# Patient Record
Sex: MALE | Race: BLACK OR AFRICAN AMERICAN
[De-identification: names, ages, dates, MRNs, and addresses within clinical notes are randomized per-mention and may not be internally consistent; named-entity substitution may affect disease eponyms.]

---

## 2021-08-31 ENCOUNTER — HOSPITAL ENCOUNTER (EMERGENCY)
Dept: HOSPITAL 53 - M ED | Age: 26
Discharge: HOME | End: 2021-08-31
Payer: COMMERCIAL

## 2021-08-31 VITALS — SYSTOLIC BLOOD PRESSURE: 150 MMHG | DIASTOLIC BLOOD PRESSURE: 90 MMHG

## 2021-08-31 VITALS — BODY MASS INDEX: 26.5 KG/M2 | HEIGHT: 73 IN | WEIGHT: 199.96 LBS
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
## 2021-08-31 DIAGNOSIS — R03.0: ICD-10-CM
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
## 2021-08-31 DIAGNOSIS — S63.635A: Primary | ICD-10-CM

## 2021-08-31 NOTE — REP
INDICATION:

left ring finger injury/decreased  strength



COMPARISON:

None.



TECHNIQUE:

Four views left hand.



FINDINGS:

There is no evidence of acute fracture, dislocation, or intrinsic bone disease.



IMPRESSION:

No fracture or dislocation.





<Electronically signed by Mirza Crowley > 08/31/21 [id number]

## 2021-09-06 ENCOUNTER — HOSPITAL ENCOUNTER (EMERGENCY)
Dept: HOSPITAL 53 - M ED | Age: 26
Discharge: LEFT BEFORE DISCHARGE | End: 2021-09-06
Payer: COMMERCIAL

## 2021-09-06 VITALS — WEIGHT: 194.01 LBS | BODY MASS INDEX: 25.71 KG/M2 | HEIGHT: 73 IN

## 2021-09-06 VITALS — SYSTOLIC BLOOD PRESSURE: 160 MMHG | DIASTOLIC BLOOD PRESSURE: 90 MMHG

## 2021-09-06 DIAGNOSIS — Z53.29: Primary | ICD-10-CM

## 2021-09-07 ENCOUNTER — HOSPITAL ENCOUNTER (INPATIENT)
Dept: HOSPITAL 53 - M ED | Age: 26
LOS: 12 days | Discharge: HOME | DRG: 340 | End: 2021-09-19
Attending: SURGERY | Admitting: SURGERY
Payer: COMMERCIAL

## 2021-09-07 VITALS — BODY MASS INDEX: 25.51 KG/M2 | HEIGHT: 73 IN | WEIGHT: 192.46 LBS

## 2021-09-07 VITALS — SYSTOLIC BLOOD PRESSURE: 127 MMHG | DIASTOLIC BLOOD PRESSURE: 74 MMHG

## 2021-09-07 VITALS — SYSTOLIC BLOOD PRESSURE: 127 MMHG | DIASTOLIC BLOOD PRESSURE: 44 MMHG

## 2021-09-07 VITALS — SYSTOLIC BLOOD PRESSURE: 130 MMHG | DIASTOLIC BLOOD PRESSURE: 76 MMHG

## 2021-09-07 VITALS — SYSTOLIC BLOOD PRESSURE: 131 MMHG | DIASTOLIC BLOOD PRESSURE: 76 MMHG

## 2021-09-07 VITALS — DIASTOLIC BLOOD PRESSURE: 76 MMHG | SYSTOLIC BLOOD PRESSURE: 131 MMHG

## 2021-09-07 DIAGNOSIS — Z20.822: ICD-10-CM

## 2021-09-07 DIAGNOSIS — K35.33: Primary | ICD-10-CM

## 2021-09-07 LAB
ALBUMIN SERPL BCG-MCNC: 3.1 GM/DL (ref 3.2–5.2)
ALT SERPL W P-5'-P-CCNC: 38 U/L (ref 12–78)
BILIRUB CONJ SERPL-MCNC: 0.1 MG/DL (ref 0–0.2)
BILIRUB SERPL-MCNC: 0.5 MG/DL (ref 0.2–1)
BUN SERPL-MCNC: 17 MG/DL (ref 7–18)
CALCIUM SERPL-MCNC: 9.8 MG/DL (ref 8.5–10.1)
CHLORIDE SERPL-SCNC: 95 MEQ/L (ref 98–107)
CO2 SERPL-SCNC: 31 MEQ/L (ref 21–32)
CREAT SERPL-MCNC: 1.3 MG/DL (ref 0.7–1.3)
GFR SERPL CREATININE-BSD FRML MDRD: > 60 ML/MIN/{1.73_M2} (ref 60–?)
GLUCOSE SERPL-MCNC: 100 MG/DL (ref 70–100)
HCT VFR BLD AUTO: 41.1 % (ref 42–52)
HGB BLD-MCNC: 13.3 G/DL (ref 13.5–17.5)
LIPASE SERPL-CCNC: 79 U/L (ref 73–393)
LYMPHOCYTES NFR BLD MANUAL: 11 % (ref 16–44)
MCH RBC QN AUTO: 27.5 PG (ref 27–33)
MCHC RBC AUTO-ENTMCNC: 32.4 G/DL (ref 32–36.5)
MCV RBC AUTO: 85.1 FL (ref 80–96)
MONOCYTES NFR BLD MANUAL: 13 % (ref 0–5)
NEUTROPHILS NFR BLD MANUAL: 65 % (ref 28–66)
PLATELET # BLD AUTO: 337 10^3/UL (ref 150–450)
PLATELET BLD QL SMEAR: NORMAL
POTASSIUM SERPL-SCNC: 3.7 MEQ/L (ref 3.5–5.1)
PROT SERPL-MCNC: 8.6 GM/DL (ref 6.4–8.2)
RBC # BLD AUTO: 4.83 10^6/UL (ref 4.3–6.1)
SODIUM SERPL-SCNC: 132 MEQ/L (ref 136–145)
VARIANT LYMPHS NFR BLD MANUAL: 1 % (ref 0–5)
WBC # BLD AUTO: 15.8 10^3/UL (ref 4–10)

## 2021-09-07 RX ADMIN — ONDANSETRON PRN MG: 2 INJECTION INTRAMUSCULAR; INTRAVENOUS at 19:24

## 2021-09-07 RX ADMIN — DEXTROSE MONOHYDRATE SCH MLS/HR: 5 INJECTION INTRAVENOUS at 22:42

## 2021-09-07 RX ADMIN — SODIUM CHLORIDE SCH MLS/HR: 9 INJECTION, SOLUTION INTRAVENOUS at 22:43

## 2021-09-07 RX ADMIN — KETOROLAC TROMETHAMINE PRN MG: 30 INJECTION, SOLUTION INTRAMUSCULAR at 22:57

## 2021-09-07 RX ADMIN — DIATRIZOATE MEGLUMINE AND DIATRIZOATE SODIUM SCH ML: 600; 100 SOLUTION ORAL; RECTAL at 15:00

## 2021-09-07 RX ADMIN — DIATRIZOATE MEGLUMINE AND DIATRIZOATE SODIUM SCH ML: 600; 100 SOLUTION ORAL; RECTAL at 14:24

## 2021-09-07 RX ADMIN — DOCUSATE SODIUM,SENNOSIDES SCH TAB: 50; 8.6 TABLET, FILM COATED ORAL at 22:42

## 2021-09-07 NOTE — REP
INDICATION:

KUB /possible appendicitis/ileus/stone.



COMPARISON:

Comparison is made with abdominal plain films from earlier on this same date.



TECHNIQUE:

Oral contrast is administered.  Helical scanning is acquired following the intravenous

injection of 100 mL of Isovue 370.



FINDINGS:

Preliminary digital  radiograph again demonstrates a moderate to marked diffuse

abdominal ileus.



On axial images, the lung bases are clear.  There is no evidence of pleural effusion

or upper abdominal ascites in the upper abdomen.



The liver and the spleen are normal in size homogeneous in texture.  No abnormality is

noted in the gallbladder or the pancreas.  Normal adrenal glands are seen.



There is minimal delay in the nephrogram phase of the right kidney and there is mild

right-sided hydronephrosis and hydroureter.



Dilated small and large bowel loops are noted throughout the abdomen.  There is

inflammatory change in the lower small bowel mesenteric fat and there is a appendiceal

abscess in the central lower abdomen.  There is a large calcified appendicular lith

just medial to the cecum and dilated tubular appendiceal lumen is seen coursing

horizontally from right to left into the central abdomen.  Above this is the

appendiceal abscess.  The abscess cavity contains air and fluid and measures 4.8 cm

anteroposterior by 6.4 cm right to left by 3.7 cm cranial to caudal.  No other abscess

is seen.  There is a tiny sliver of right pericolic gutter fluid.  No free air is

seen.  The Pamela appendiceal abscess is not a drainable by image guided catheter

technique.  It is central and surrounded by bowel.



IMPRESSION:

Acute appendicitis with 6.4 cm central abdominal appendiceal abscess.  There is a

large appendicular lith.  No free air.  There is moderate to marked diffuse ileus.

There is mild right-sided hydronephrosis and obstructive nephrogram on the right.  The

right ureter is presumed to be compressed by the central abdominopelvic abscess.





<Electronically signed by Kalen Ibarra > 09/07/21 1013

## 2021-09-07 NOTE — REP
INDICATION:

abdominal pain/possible constipation.



COMPARISON:

None.



TECHNIQUE:



Supine views the abdomen, two views.  KUB.



FINDINGS:

Supine views the abdomen demonstrate a moderate ileus pattern in the bowel gas with

air-filled dilated loops of large and small bowel throughout the upper abdomen.



In addition, there is a 1.5 cm calcification in the right mid abdomen surrounded by

soft tissue density.  This may reflect an enterolith such as appendicular lith with

appendicitis, granulomatous lymph node calcification, or a very large ureteral stone.



Lastly there is the suggestion of minimal ascites.  No organomegaly seen. No other

pathologic calcification noted.



IMPRESSION:

Moderate acute ileus pattern in the bowel gas.  1.5 cm calcification in the right mid

abdomen surrounded by mass effect.  Suggestion of mild ascites.  Acute appendicitis

versus large ureteral stone versus mesenteric esteban calcification.  Recommend CT study

abdomen and pelvis, preferably with IV contrast.





<Electronically signed by Kalen Ibarra > 09/07/21 0916

## 2021-09-07 NOTE — HPE
HISTORY AND PHYSICAL



DATE OF ADMISSION:  09/07/2021



CHIEF COMPLAINT: Abdominal pain.  



HISTORY OF PRESENT ILLNESS:  The patient is a 26-year-old male who presented

with periumbilical and right upper quadrant abdominal pain that started last

Wednesday. He thought he was constipated.  He tried laxatives with minimal

results.  Pain has been persistent since then. He has had slight nausea, no

emesis.  No fevers or chills.  No other recent changes. CT in the emergency

room was positive for a perforated appendix with an abscess; however, the

abscess was unable to be reached with percutaneous drainage; therefore, I was

asked to evaluate him for surgery.  Currently, in the emergency room, he just

spiked a fever of 101, otherwise he has not had one prior. No prior abdominal

surgeries.  No trauma to the area.  No other complaints.  



PAST MEDICAL HISTORY:  Negative. 



PAST SURGICAL HISTORY:  Negative.  



ALLERGIES:  None.



HOME MEDICATIONS:  None.  



SOCIAL HISTORY:  Denies drugs, alcohol or tobacco abuse.  



FAMILY HISTORY:  Noncontributory.



REVIEW OF SYSTEMS:  Pertinent positives and negatives as stated in the history

of present illness. 



PHYSICAL EXAMINATION:  General:  Alert and oriented x3, in no acute distress.

Vitas:  Temperature 101.5, pulse 92, respirations 18, blood pressure 166/89,

pulse oximetry 100% on room air.  HEENT:  Pupils equal, round and reactive to

light and accommodation. Heart:  S1, S2, regular rate and rhythm.  Lungs: Clear

to auscultation bilaterally.  Abdomen: Soft, tender to palpation, mainly

periumbilically.  Diffuse abdominal distension. No rigidity.  Extremities:  No

clubbing, cyanosis or edema.  



LABORATORY: White count 15.8, hemoglobin 13.3, platelets 337, potassium 3.7,

creatinine 1.3, lactic acid 0.9, C-reactive protein 21.3.



IMAGING:  CT abdomen and pelvis showed acute appendicitis with a 6.4 cm central

abdominal abscess. Large appendicolith.  No free air.  Moderate diffuse ileus.  



ASSESSMENT AND PLAN:  The patient is a young 26-year-old male currently with an

acute perforated appendicitis that is not amenable to drainage by IR due to

location behind some bowel loops.  Recommendation is to take him to the

operating room for abdominal washout, drain placement and possible

appendicectomy.  Risks and benefits of procedure were not limited to, but

including bleeding, infection, herniated, damage to surrounding structures,

need for further surgery were discussed in detail with the patient.  Informed

consent was obtained and procedure is planned.  Postoperatively, he will be

kept overnight at minimum with IV fluids and antibiotics.  Once he is afebrile

for 24 hours and pain and labs have improved, he will be able to be discharged

home.

## 2021-09-08 VITALS — SYSTOLIC BLOOD PRESSURE: 132 MMHG | DIASTOLIC BLOOD PRESSURE: 83 MMHG

## 2021-09-08 VITALS — SYSTOLIC BLOOD PRESSURE: 126 MMHG | DIASTOLIC BLOOD PRESSURE: 42 MMHG

## 2021-09-08 VITALS — SYSTOLIC BLOOD PRESSURE: 149 MMHG | DIASTOLIC BLOOD PRESSURE: 90 MMHG

## 2021-09-08 VITALS — DIASTOLIC BLOOD PRESSURE: 83 MMHG | SYSTOLIC BLOOD PRESSURE: 128 MMHG

## 2021-09-08 VITALS — DIASTOLIC BLOOD PRESSURE: 81 MMHG | SYSTOLIC BLOOD PRESSURE: 136 MMHG

## 2021-09-08 VITALS — DIASTOLIC BLOOD PRESSURE: 82 MMHG | SYSTOLIC BLOOD PRESSURE: 135 MMHG

## 2021-09-08 VITALS — SYSTOLIC BLOOD PRESSURE: 129 MMHG | DIASTOLIC BLOOD PRESSURE: 75 MMHG

## 2021-09-08 LAB
BUN SERPL-MCNC: 17 MG/DL (ref 7–18)
CALCIUM SERPL-MCNC: 9.1 MG/DL (ref 8.5–10.1)
CHLORIDE SERPL-SCNC: 99 MEQ/L (ref 98–107)
CO2 SERPL-SCNC: 30 MEQ/L (ref 21–32)
CREAT SERPL-MCNC: 1.29 MG/DL (ref 0.7–1.3)
GFR SERPL CREATININE-BSD FRML MDRD: > 60 ML/MIN/{1.73_M2} (ref 60–?)
GLUCOSE SERPL-MCNC: 139 MG/DL (ref 70–100)
HCT VFR BLD AUTO: 38.1 % (ref 42–52)
HGB BLD-MCNC: 12.3 G/DL (ref 13.5–17.5)
MCH RBC QN AUTO: 27.2 PG (ref 27–33)
MCHC RBC AUTO-ENTMCNC: 32.3 G/DL (ref 32–36.5)
MCV RBC AUTO: 84.1 FL (ref 80–96)
PLATELET # BLD AUTO: 345 10^3/UL (ref 150–450)
POTASSIUM SERPL-SCNC: 4.6 MEQ/L (ref 3.5–5.1)
RBC # BLD AUTO: 4.53 10^6/UL (ref 4.3–6.1)
SODIUM SERPL-SCNC: 134 MEQ/L (ref 136–145)
WBC # BLD AUTO: 13.2 10^3/UL (ref 4–10)

## 2021-09-08 PROCEDURE — 0D9J40Z DRAINAGE OF APPENDIX WITH DRAINAGE DEVICE, PERCUTANEOUS ENDOSCOPIC APPROACH: ICD-10-PCS | Performed by: SURGERY

## 2021-09-08 RX ADMIN — ONDANSETRON PRN MG: 2 INJECTION INTRAMUSCULAR; INTRAVENOUS at 16:25

## 2021-09-08 RX ADMIN — HYDROCODONE BITARTRATE AND ACETAMINOPHEN PRN TAB: 5; 325 TABLET ORAL at 03:25

## 2021-09-08 RX ADMIN — SODIUM CHLORIDE SCH MLS/HR: 9 INJECTION, SOLUTION INTRAVENOUS at 05:28

## 2021-09-08 RX ADMIN — DEXTROSE MONOHYDRATE SCH MLS/HR: 5 INJECTION INTRAVENOUS at 05:28

## 2021-09-08 RX ADMIN — SODIUM CHLORIDE SCH MLS/HR: 9 INJECTION, SOLUTION INTRAVENOUS at 10:50

## 2021-09-08 RX ADMIN — SODIUM CHLORIDE SCH MLS/HR: 9 INJECTION, SOLUTION INTRAVENOUS at 16:26

## 2021-09-08 RX ADMIN — DEXTROSE MONOHYDRATE SCH MLS/HR: 5 INJECTION INTRAVENOUS at 11:13

## 2021-09-08 RX ADMIN — SODIUM CHLORIDE SCH MLS/HR: 9 INJECTION, SOLUTION INTRAVENOUS at 23:37

## 2021-09-08 RX ADMIN — ONDANSETRON PRN MG: 2 INJECTION INTRAMUSCULAR; INTRAVENOUS at 04:19

## 2021-09-08 RX ADMIN — KETOROLAC TROMETHAMINE PRN MG: 30 INJECTION, SOLUTION INTRAMUSCULAR at 23:47

## 2021-09-08 RX ADMIN — DEXTROSE MONOHYDRATE SCH MLS/HR: 5 INJECTION INTRAVENOUS at 22:08

## 2021-09-08 RX ADMIN — DOCUSATE SODIUM,SENNOSIDES SCH TAB: 50; 8.6 TABLET, FILM COATED ORAL at 09:02

## 2021-09-08 RX ADMIN — DOCUSATE SODIUM,SENNOSIDES SCH TAB: 50; 8.6 TABLET, FILM COATED ORAL at 22:08

## 2021-09-08 RX ADMIN — DEXTROSE MONOHYDRATE SCH MLS/HR: 5 INJECTION INTRAVENOUS at 16:25

## 2021-09-08 RX ADMIN — KETOROLAC TROMETHAMINE PRN MG: 30 INJECTION, SOLUTION INTRAMUSCULAR at 16:26

## 2021-09-08 NOTE — REP
INDICATION:

NG placement.



COMPARISON:

None.



TECHNIQUE:

Single portable AP view of the chest was performed.



FINDINGS:

There is no acute infiltrate or pulmonary edema.  Lungs are clear.  The heart is not

significantly enlarged.  The mediastinal silhouette is unremarkable.  The visualized

osseous structures are intact.There is a nasogastric tube, the side port is just above

the gastroesophageal junction.  The tube should be advanced.  The stomach is

moderately dilated with air and fluid and there are adjacent mildly dilated small

bowel loops.



IMPRESSION:

No acute pulmonary disease.There is a nasogastric tube, the side port is just above

the gastroesophageal junction. The tube should be advanced.





<Electronically signed by Mirza Crowley > 09/08/21 [id number]

## 2021-09-08 NOTE — IPNPDOC
Text Note


Date of Service


The patient was seen on 9/8/21.





NOTE


General surgery.  Dr. Horowitz





The patient is a 26-year-old male admitted with acute perforated appendicitis 

status post laparoscopic appendectomy with drain placement 9/7/2021 as per Dr. Horowitz.





This morning, the patient was noted to have some abdominal distention, 

postoperative ileus.  The patient was made n.p.o., NG tube to LIS requested.


Currently, the patient is resting comfortably in bed.





Afebrile.


Heart rate 88, respiratory rate 18, blood pressure 132/83, 95% room air.


Awake and alert sitting up in bed, no acute distress


S1-S2 regular rate rhythm


Lungs clear to auscultation


Abdomen soft, distention noted, drainage tube in place with serosanguineous 

drainage noted.


Extremities with no edema





WBC 13.2, this is compared with 15.8 yesterday.  Hemoglobin 12.3





Assessment/plan


Acute perforated appendicitis status post laparoscopic appendectomy with drain 

placement 9/7/2021 as per Dr. Horowitz.


The patient is reviewed and examined as per Dr. Horowitz this morning.  The 

patient is noted to have postoperative ileus.  The patient is NPO, NG tube to 

LIS requested.


Afebrile


WBC 13.2 which is decreased compared with yesterday


IV Zosyn


IV fluids 125 cc/hr


Drain in place with 180 mL drainage noted yesterday.


Continue to closely monitor.





VS,Fishbone, I+O


VS, Fishbone, I+O


Laboratory Tests


9/7/21 13:29








9/8/21 05:53











Vital Signs








  Date Time  Temp Pulse Resp B/P (MAP) Pulse Ox O2 Delivery O2 Flow Rate FiO2


 


9/8/21 10:00 98.5 88 18 132/83 (99) 95   


 


9/8/21 04:00       1.0 


 


9/8/21 03:55      Nasal Cannula  














I&O- Last 24 Hours up to 6 AM 


 


 9/8/21





 05:59


 


Intake Total 2610 ml


 


Output Total 2112 ml


 


Balance 498 ml

















Mini Reyes               Sep 8, 2021 13:04

## 2021-09-09 VITALS — DIASTOLIC BLOOD PRESSURE: 82 MMHG | SYSTOLIC BLOOD PRESSURE: 136 MMHG

## 2021-09-09 VITALS — DIASTOLIC BLOOD PRESSURE: 79 MMHG | SYSTOLIC BLOOD PRESSURE: 137 MMHG

## 2021-09-09 VITALS — SYSTOLIC BLOOD PRESSURE: 136 MMHG | DIASTOLIC BLOOD PRESSURE: 78 MMHG

## 2021-09-09 LAB
BUN SERPL-MCNC: 21 MG/DL (ref 7–18)
CALCIUM SERPL-MCNC: 8.8 MG/DL (ref 8.5–10.1)
CHLORIDE SERPL-SCNC: 101 MEQ/L (ref 98–107)
CO2 SERPL-SCNC: 31 MEQ/L (ref 21–32)
CREAT SERPL-MCNC: 1.16 MG/DL (ref 0.7–1.3)
GFR SERPL CREATININE-BSD FRML MDRD: > 60 ML/MIN/{1.73_M2} (ref 60–?)
GLUCOSE SERPL-MCNC: 109 MG/DL (ref 70–100)
HCT VFR BLD AUTO: 37.5 % (ref 42–52)
HGB BLD-MCNC: 11.9 G/DL (ref 13.5–17.5)
MCH RBC QN AUTO: 27.4 PG (ref 27–33)
MCHC RBC AUTO-ENTMCNC: 31.7 G/DL (ref 32–36.5)
MCV RBC AUTO: 86.4 FL (ref 80–96)
PLATELET # BLD AUTO: 406 10^3/UL (ref 150–450)
POTASSIUM SERPL-SCNC: 3.6 MEQ/L (ref 3.5–5.1)
RBC # BLD AUTO: 4.34 10^6/UL (ref 4.3–6.1)
SODIUM SERPL-SCNC: 137 MEQ/L (ref 136–145)
WBC # BLD AUTO: 15.2 10^3/UL (ref 4–10)

## 2021-09-09 RX ADMIN — SODIUM CHLORIDE SCH MLS/HR: 9 INJECTION, SOLUTION INTRAVENOUS at 09:09

## 2021-09-09 RX ADMIN — ONDANSETRON PRN MG: 2 INJECTION INTRAMUSCULAR; INTRAVENOUS at 09:24

## 2021-09-09 RX ADMIN — DOCUSATE SODIUM,SENNOSIDES SCH TAB: 50; 8.6 TABLET, FILM COATED ORAL at 21:55

## 2021-09-09 RX ADMIN — HYDROCODONE BITARTRATE AND ACETAMINOPHEN PRN TAB: 5; 325 TABLET ORAL at 09:27

## 2021-09-09 RX ADMIN — KETOROLAC TROMETHAMINE PRN MG: 30 INJECTION, SOLUTION INTRAMUSCULAR at 19:05

## 2021-09-09 RX ADMIN — SODIUM CHLORIDE SCH MLS/HR: 9 INJECTION, SOLUTION INTRAVENOUS at 19:05

## 2021-09-09 RX ADMIN — DEXTROSE MONOHYDRATE SCH MLS/HR: 5 INJECTION INTRAVENOUS at 17:10

## 2021-09-09 RX ADMIN — DEXTROSE MONOHYDRATE SCH MLS/HR: 5 INJECTION INTRAVENOUS at 21:55

## 2021-09-09 RX ADMIN — DEXTROSE MONOHYDRATE SCH MLS/HR: 5 INJECTION INTRAVENOUS at 04:31

## 2021-09-09 RX ADMIN — DOCUSATE SODIUM,SENNOSIDES SCH TAB: 50; 8.6 TABLET, FILM COATED ORAL at 09:09

## 2021-09-09 RX ADMIN — SODIUM CHLORIDE SCH MLS/HR: 9 INJECTION, SOLUTION INTRAVENOUS at 22:05

## 2021-09-09 RX ADMIN — KETOROLAC TROMETHAMINE PRN MG: 30 INJECTION, SOLUTION INTRAMUSCULAR at 09:23

## 2021-09-09 RX ADMIN — DEXTROSE MONOHYDRATE SCH MLS/HR: 5 INJECTION INTRAVENOUS at 09:24

## 2021-09-09 NOTE — IPNPDOC
Text Note


Date of Service


The patient was seen on 9/9/21.





NOTE


General surgery.  Dr. Horowitz





The patient is a 26-year-old male admitted with acute perforated appendicitis 

status post laparoscopic appendectomy with drain placement 9/7/2021 as per Dr. Horowitz.


The patient was noted to have postoperative ileus 9/8, NG tube was placed.  This

morning, the patient reports abdominal bloating and distention is much improved.

 States he has had a bowel movement.  Reports flatus.  Overall reports he is 

feeling much better.





Afebrile


Heart rate 87, respiratory rate 19, blood pressure 136/82, 97% room air


Lungs are clear to auscultation


S1-S2 regular rate rhythm


Abdomen soft, still distended but less pronounced today.  Some mild tenderness 

noted around drain site only.  Drain site intact with cloudy drainage noted.  

220 mL output yesterday.





WBC 15.2, this is increased from 13.2 yesterday.  Hemoglobin 11.9.


BMP this morning pending.


Blood culture x2 negative.





Assessment/plan


Acute perforated appendicitis status post laparoscopic appendectomy with drain 

placement 9/7/2021 as per Dr. Horowitz.


The patient is reviewed and examined as per Dr. Horowitz this morning.  


The patient is noted to have postoperative ileus.  Continue NPO, NG tube.  Will 

check abdominal x-ray to reevaluate.


Afebrile.  WBC is noted to be 15.2, this is slightly increased compared with 

yesterday.


IV Zosyn


IV fluids at 125 cc/h.


Drain in place with 220 mL output yesterday.


Continue to monitor closely, further recommendations pending review of imaging.





VS,Fishbone, I+O


VS, Fishbone, I+O


Laboratory Tests


9/9/21 08:48











Vital Signs








  Date Time  Temp Pulse Resp B/P (MAP) Pulse Ox O2 Delivery O2 Flow Rate FiO2


 


9/9/21 06:00 98.1 87 19 136/82 (100) 97 Room Air  


 


9/8/21 04:00       1.0 














I&O- Last 24 Hours up to 6 AM 


 


 9/9/21





 06:00


 


Intake Total 1740 ml


 


Output Total 2240 ml


 


Balance -500 ml

















Mini Reyes               Sep 9, 2021 09:18

## 2021-09-09 NOTE — REP
INDICATION:

ileus?.



COMPARISON:

Frontal view of the chest 09/08/2021



TECHNIQUE:

Supine and upright views of the abdomen with frontal view of the chest



FINDINGS:

The frontal view the chest is unchanged from the prior exam with the exception of

advancement of the nasogastric tube which is now in the region of the stomach fundus.

The lung fields are clear and the pleural angles are sharp.



Multiple dilated gas and fluid-filled small bowel loops are seen in the abdomen and in

a stepladder fashion on the upright view.  There is no evidence of free air.  A small

amount of radiographic contrast is seen in the descending colon.  A surgical drainage

tube is seen on the right.







IMPRESSION:

SBO versus ileus follow-up is recommended.





<Electronically signed by Monty Blair > 09/09/21 [id number]

## 2021-09-10 VITALS — SYSTOLIC BLOOD PRESSURE: 138 MMHG | DIASTOLIC BLOOD PRESSURE: 83 MMHG

## 2021-09-10 VITALS — SYSTOLIC BLOOD PRESSURE: 137 MMHG | DIASTOLIC BLOOD PRESSURE: 78 MMHG

## 2021-09-10 VITALS — DIASTOLIC BLOOD PRESSURE: 84 MMHG | SYSTOLIC BLOOD PRESSURE: 137 MMHG

## 2021-09-10 LAB
BUN SERPL-MCNC: 20 MG/DL (ref 7–18)
CALCIUM SERPL-MCNC: 9 MG/DL (ref 8.5–10.1)
CHLORIDE SERPL-SCNC: 102 MEQ/L (ref 98–107)
CO2 SERPL-SCNC: 32 MEQ/L (ref 21–32)
CREAT SERPL-MCNC: 1.24 MG/DL (ref 0.7–1.3)
GFR SERPL CREATININE-BSD FRML MDRD: > 60 ML/MIN/{1.73_M2} (ref 60–?)
GLUCOSE SERPL-MCNC: 99 MG/DL (ref 70–100)
HCT VFR BLD AUTO: 36.8 % (ref 42–52)
HGB BLD-MCNC: 11.5 G/DL (ref 13.5–17.5)
MCH RBC QN AUTO: 27.3 PG (ref 27–33)
MCHC RBC AUTO-ENTMCNC: 31.3 G/DL (ref 32–36.5)
MCV RBC AUTO: 87.4 FL (ref 80–96)
PLATELET # BLD AUTO: 453 10^3/UL (ref 150–450)
POTASSIUM SERPL-SCNC: 4 MEQ/L (ref 3.5–5.1)
RBC # BLD AUTO: 4.21 10^6/UL (ref 4.3–6.1)
SODIUM SERPL-SCNC: 139 MEQ/L (ref 136–145)
WBC # BLD AUTO: 14.8 10^3/UL (ref 4–10)

## 2021-09-10 RX ADMIN — KETOROLAC TROMETHAMINE PRN MG: 30 INJECTION, SOLUTION INTRAMUSCULAR at 13:58

## 2021-09-10 RX ADMIN — DEXTROSE MONOHYDRATE SCH MLS/HR: 5 INJECTION INTRAVENOUS at 16:54

## 2021-09-10 RX ADMIN — SODIUM CHLORIDE SCH MLS/HR: 9 INJECTION, SOLUTION INTRAVENOUS at 09:07

## 2021-09-10 RX ADMIN — DEXTROSE MONOHYDRATE SCH MLS/HR: 5 INJECTION INTRAVENOUS at 22:43

## 2021-09-10 RX ADMIN — DOCUSATE SODIUM,SENNOSIDES SCH TAB: 50; 8.6 TABLET, FILM COATED ORAL at 21:00

## 2021-09-10 RX ADMIN — HYDROCODONE BITARTRATE AND ACETAMINOPHEN PRN TAB: 5; 325 TABLET ORAL at 09:06

## 2021-09-10 RX ADMIN — KETOROLAC TROMETHAMINE PRN MG: 30 INJECTION, SOLUTION INTRAMUSCULAR at 21:14

## 2021-09-10 RX ADMIN — DOCUSATE SODIUM,SENNOSIDES SCH TAB: 50; 8.6 TABLET, FILM COATED ORAL at 09:07

## 2021-09-10 RX ADMIN — DOCUSATE SODIUM,SENNOSIDES SCH TAB: 50; 8.6 TABLET, FILM COATED ORAL at 21:01

## 2021-09-10 RX ADMIN — DEXTROSE MONOHYDRATE SCH MLS/HR: 5 INJECTION INTRAVENOUS at 04:23

## 2021-09-10 RX ADMIN — SODIUM CHLORIDE SCH MLS/HR: 9 INJECTION, SOLUTION INTRAVENOUS at 15:50

## 2021-09-10 RX ADMIN — HYDROCODONE BITARTRATE AND ACETAMINOPHEN PRN TAB: 5; 325 TABLET ORAL at 17:00

## 2021-09-10 RX ADMIN — DEXTROSE MONOHYDRATE SCH MLS/HR: 5 INJECTION INTRAVENOUS at 09:06

## 2021-09-10 RX ADMIN — KETOROLAC TROMETHAMINE PRN MG: 30 INJECTION, SOLUTION INTRAMUSCULAR at 04:32

## 2021-09-10 NOTE — IPNPDOC
Text Note


Date of Service


The patient was seen on 9/10/21.





NOTE


General surgery.  Dr. Horowitz





The patient is a 26-year-old male admitted with acute perforated appendicitis 

status post laparoscopic appendectomy with drain placement 9/7/2021 as per Dr. Horowitz.


The patient was noted to have postoperative ileus 9/8, NG tube was placed.  





The dates he is still feeling bloated, no bowel movement reported yesterday.  

Patient states he is not passing flatus.  Has not been out of bed yet today.





Afebrile


Heart rate 71, respiratory rate 20, blood pressure 137/78, 100% room air


Lungs are clear to auscultation


S1-S2 regular rate rhythm


Abdomen soft, still with some distention.  No tenderness noted.  Drain site 

intact drainage is now clear, serous.  Output not recorded yesterday.





WBC 14.8, 15.2 yesterday.  Hemoglobin 11.5.


Blood culture x2 negative.





Assessment/plan


Acute perforated appendicitis status post laparoscopic appendectomy with drain 

placement 9/7/2021 as per Dr. Horowitz.


The patient is noted to have postoperative ileus.  Continue NPO, NG tube.  


Afebrile.  WBC is noted to be 14.8, this is slightly increased compared with yes

terday.


IV Zosyn


IV fluids at 125 cc/h.


Drain in place with clear drainage noted now, output is not recorded yesterday


Continue to monitor closely. 





Addendum.  The patient is reviewed with Dr. Horowitz, the patient subsequently had

large bowel movement this morning.  Plan to clamp NG tube, trial of clear 

liquids.





VS,Fishbone, I+O


VS, Fishbone, I+O


Laboratory Tests


9/10/21 07:04











Vital Signs








  Date Time  Temp Pulse Resp B/P (MAP) Pulse Ox O2 Delivery O2 Flow Rate FiO2


 


9/10/21 05:52 97.0 71 20 137/78 (97) 100 Room Air  


 


9/8/21 04:00       1.0 














I&O- Last 24 Hours up to 6 AM 


 


 9/10/21





 05:59


 


Intake Total 1565 ml


 


Output Total 2900 ml


 


Balance -1335 ml

















Mini Reyes              Sep 10, 2021 08:53

## 2021-09-10 NOTE — IPNPDOC
Text Note


Date of Service


The patient was seen on 9/10/21.





NOTE


No acute events overnight. He is tolerating the NG, and is still having BMS. His

abd distention and pain are much improved. 





VSSAF





NAD





abd - soft, nd, NT, incisions c/d/i, drain with slightly cloudy fluid much less 

purulent than yesterday. 





labs - below





A) 27y/o male s/p lap washout of perforated appendix with abscess and drain 

placement


P) clamp NG


clq diet


amb in you


plan on advancing diet and DC ng if he can tolerate diet until 5pm





Lucien Horowitz DO





VS,Fishbone, I+O


VS, Fishbone, I+O


Laboratory Tests


9/10/21 07:04











Vital Signs








  Date Time  Temp Pulse Resp B/P (MAP) Pulse Ox O2 Delivery O2 Flow Rate FiO2


 


9/10/21 05:52 97.0 71 20 137/78 (97) 100 Room Air  


 


9/8/21 04:00       1.0 














I&O- Last 24 Hours up to 6 AM 


 


 9/10/21





 05:59


 


Intake Total 1565 ml


 


Output Total 2900 ml


 


Balance -1335 ml

















CAROLA HOROWITZ DO            Sep 10, 2021 08:55

## 2021-09-11 VITALS — DIASTOLIC BLOOD PRESSURE: 82 MMHG | SYSTOLIC BLOOD PRESSURE: 139 MMHG

## 2021-09-11 VITALS — DIASTOLIC BLOOD PRESSURE: 82 MMHG | SYSTOLIC BLOOD PRESSURE: 135 MMHG

## 2021-09-11 VITALS — DIASTOLIC BLOOD PRESSURE: 83 MMHG | SYSTOLIC BLOOD PRESSURE: 135 MMHG

## 2021-09-11 LAB
BUN SERPL-MCNC: 13 MG/DL (ref 7–18)
CALCIUM SERPL-MCNC: 9.1 MG/DL (ref 8.5–10.1)
CHLORIDE SERPL-SCNC: 103 MEQ/L (ref 98–107)
CO2 SERPL-SCNC: 30 MEQ/L (ref 21–32)
CREAT SERPL-MCNC: 1.14 MG/DL (ref 0.7–1.3)
GFR SERPL CREATININE-BSD FRML MDRD: > 60 ML/MIN/{1.73_M2} (ref 60–?)
GLUCOSE SERPL-MCNC: 103 MG/DL (ref 70–100)
HCT VFR BLD AUTO: 38.1 % (ref 42–52)
HGB BLD-MCNC: 11.9 G/DL (ref 13.5–17.5)
MCH RBC QN AUTO: 27.2 PG (ref 27–33)
MCHC RBC AUTO-ENTMCNC: 31.2 G/DL (ref 32–36.5)
MCV RBC AUTO: 87 FL (ref 80–96)
PLATELET # BLD AUTO: 485 10^3/UL (ref 150–450)
POTASSIUM SERPL-SCNC: 3.5 MEQ/L (ref 3.5–5.1)
RBC # BLD AUTO: 4.38 10^6/UL (ref 4.3–6.1)
SODIUM SERPL-SCNC: 137 MEQ/L (ref 136–145)
WBC # BLD AUTO: 18 10^3/UL (ref 4–10)

## 2021-09-11 RX ADMIN — KETOROLAC TROMETHAMINE PRN MG: 30 INJECTION, SOLUTION INTRAMUSCULAR at 21:10

## 2021-09-11 RX ADMIN — KETOROLAC TROMETHAMINE PRN MG: 30 INJECTION, SOLUTION INTRAMUSCULAR at 07:34

## 2021-09-11 RX ADMIN — DOCUSATE SODIUM,SENNOSIDES SCH TAB: 50; 8.6 TABLET, FILM COATED ORAL at 21:08

## 2021-09-11 RX ADMIN — SODIUM CHLORIDE SCH MLS/HR: 9 INJECTION, SOLUTION INTRAVENOUS at 05:29

## 2021-09-11 RX ADMIN — ACETAMINOPHEN PRN MG: 325 TABLET ORAL at 23:14

## 2021-09-11 RX ADMIN — DEXTROSE MONOHYDRATE SCH MLS/HR: 5 INJECTION INTRAVENOUS at 23:06

## 2021-09-11 RX ADMIN — SODIUM CHLORIDE SCH MLS/HR: 9 INJECTION, SOLUTION INTRAVENOUS at 15:05

## 2021-09-11 RX ADMIN — DEXTROSE MONOHYDRATE SCH MLS/HR: 5 INJECTION INTRAVENOUS at 17:04

## 2021-09-11 RX ADMIN — DOCUSATE SODIUM,SENNOSIDES SCH TAB: 50; 8.6 TABLET, FILM COATED ORAL at 07:34

## 2021-09-11 RX ADMIN — DEXTROSE MONOHYDRATE SCH MLS/HR: 5 INJECTION INTRAVENOUS at 04:32

## 2021-09-11 RX ADMIN — DEXTROSE MONOHYDRATE SCH MLS/HR: 5 INJECTION INTRAVENOUS at 11:14

## 2021-09-11 RX ADMIN — SODIUM CHLORIDE SCH MLS/HR: 9 INJECTION, SOLUTION INTRAVENOUS at 11:14

## 2021-09-11 NOTE — IPN
PROGRESS NOTE



DATE:  09/11/2021



HISTORY: The patient is a 26-year-old man who was admitted on the evening of

the 7th of September with abdominal pain. CT scan revealed evidence for

appendicitis with abscess. This was not felt to be amenable to percutaneous

drainage, Dr. Horowitz took him to the [facility] where he underwent laparoscopy with

placement of a drain. He has been continued on Zosyn for antibiotic coverage.

He had some difficulty with an ileus but has not been advanced back to a

regular diet as of last evening. He reports some discomfort in his right mid

and lower abdomen in particular. He denies any nausea or vomiting currently. He

has had some flatus. His abdomen remains somewhat distended. 



Vital signs show that he has been afebrile over the past 24 hours. His pulse is

in the upper 70's to 80 and his blood pressure is good. His room air oxygen

saturations are normal. 



Intake and output show that yesterday he had probably not the 13 liters that

are recorded. I suspect this represents a typo. He had gastric drainage of 875

with 40 mL of drainage from his abdominal drain. 



PHYSICAL EXAMINATION: 

GENERAL APPEARANCE: A thin young man propped up in the bed. He is alert and

oriented. 

HEART: Regular rate and rhythm and he is not tachycardic.

LUNGS: Clear. 

ABDOMEN: Somewhat protuberant. He has some bowel sounds but these seem

diminished. He has a drain exiting the lower mid abdomen. This has a small

amount of serous fluid in the bottle with a small amount of turbid fluid in the

tubing. He has another trocar site in the left upper abdomen. 



LABORATORY STUDIES: Today white count of 18,000 which is elevated from 15,000

yesterday. Hemoglobin is 12 with a hematocrit of 38 and the platelet count is

485,000. 



Chemistry profile shows a sodium of 137, potassium 3.5, chloride 103, CO2 of

30, BUN of 13, creatinine 1.1 and a glucose of 103. 



IMPRESSION: The patient overall appears to be doing somewhat better. His NG

tube was removed last evening and he has tolerated a diet. He remains somewhat

distended. His white count has gone up slightly today to 18,000 though he

remains afebrile. 



PLAN: At this point I have decided to keep him in the hospital at least another

day. His Zosyn will be continued. His labs will be repeated in the morning. If

his white count continues to rise, then a repeat CT scan may be required to

look for evidence of an undrained collection. 

DENA

## 2021-09-12 VITALS — SYSTOLIC BLOOD PRESSURE: 141 MMHG | DIASTOLIC BLOOD PRESSURE: 81 MMHG

## 2021-09-12 VITALS — DIASTOLIC BLOOD PRESSURE: 83 MMHG | SYSTOLIC BLOOD PRESSURE: 142 MMHG

## 2021-09-12 VITALS — SYSTOLIC BLOOD PRESSURE: 141 MMHG | DIASTOLIC BLOOD PRESSURE: 83 MMHG

## 2021-09-12 LAB
BASOPHILS # BLD AUTO: 0.1 10^3/UL (ref 0–0.2)
BASOPHILS NFR BLD AUTO: 0.3 % (ref 0–1)
BUN SERPL-MCNC: 11 MG/DL (ref 7–18)
CALCIUM SERPL-MCNC: 9.3 MG/DL (ref 8.5–10.1)
CHLORIDE SERPL-SCNC: 105 MEQ/L (ref 98–107)
CO2 SERPL-SCNC: 29 MEQ/L (ref 21–32)
CREAT SERPL-MCNC: 1.01 MG/DL (ref 0.7–1.3)
EOSINOPHIL # BLD AUTO: 0.1 10^3/UL (ref 0–0.5)
EOSINOPHIL NFR BLD AUTO: 0.8 % (ref 0–3)
GFR SERPL CREATININE-BSD FRML MDRD: > 60 ML/MIN/{1.73_M2} (ref 60–?)
GLUCOSE SERPL-MCNC: 90 MG/DL (ref 70–100)
HCT VFR BLD AUTO: 36.8 % (ref 42–52)
HGB BLD-MCNC: 11.5 G/DL (ref 13.5–17.5)
LYMPHOCYTES # BLD AUTO: 1.5 10^3/UL (ref 1.5–5)
LYMPHOCYTES NFR BLD AUTO: 8.8 % (ref 24–44)
MCH RBC QN AUTO: 27.2 PG (ref 27–33)
MCHC RBC AUTO-ENTMCNC: 31.3 G/DL (ref 32–36.5)
MCV RBC AUTO: 87 FL (ref 80–96)
MONOCYTES # BLD AUTO: 0.8 10^3/UL (ref 0–0.8)
MONOCYTES NFR BLD AUTO: 4.5 % (ref 2–8)
NEUTROPHILS # BLD AUTO: 14.3 10^3/UL (ref 1.5–8.5)
NEUTROPHILS NFR BLD AUTO: 83.1 % (ref 36–66)
PLATELET # BLD AUTO: 476 10^3/UL (ref 150–450)
POTASSIUM SERPL-SCNC: 3.9 MEQ/L (ref 3.5–5.1)
RBC # BLD AUTO: 4.23 10^6/UL (ref 4.3–6.1)
SODIUM SERPL-SCNC: 139 MEQ/L (ref 136–145)
WBC # BLD AUTO: 17.2 10^3/UL (ref 4–10)

## 2021-09-12 RX ADMIN — DEXTROSE MONOHYDRATE SCH MLS/HR: 5 INJECTION INTRAVENOUS at 15:56

## 2021-09-12 RX ADMIN — KETOROLAC TROMETHAMINE PRN MG: 30 INJECTION, SOLUTION INTRAMUSCULAR at 06:34

## 2021-09-12 RX ADMIN — DOCUSATE SODIUM,SENNOSIDES SCH TAB: 50; 8.6 TABLET, FILM COATED ORAL at 10:17

## 2021-09-12 RX ADMIN — DEXTROSE MONOHYDRATE SCH MG: 50 INJECTION, SOLUTION INTRAVENOUS at 15:56

## 2021-09-12 RX ADMIN — METOCLOPRAMIDE SCH MG: 5 INJECTION, SOLUTION INTRAMUSCULAR; INTRAVENOUS at 14:23

## 2021-09-12 RX ADMIN — DEXTROSE MONOHYDRATE SCH MLS/HR: 5 INJECTION INTRAVENOUS at 05:05

## 2021-09-12 RX ADMIN — POTASSIUM CHLORIDE, DEXTROSE MONOHYDRATE AND SODIUM CHLORIDE SCH MLS/HR: 150; 5; 450 INJECTION, SOLUTION INTRAVENOUS at 14:22

## 2021-09-12 RX ADMIN — DIATRIZOATE MEGLUMINE AND DIATRIZOATE SODIUM SCH ML: 600; 100 SOLUTION ORAL; RECTAL at 11:05

## 2021-09-12 RX ADMIN — DEXTROSE MONOHYDRATE SCH MLS/HR: 5 INJECTION INTRAVENOUS at 10:18

## 2021-09-12 RX ADMIN — METOCLOPRAMIDE SCH MG: 5 INJECTION, SOLUTION INTRAMUSCULAR; INTRAVENOUS at 20:11

## 2021-09-12 RX ADMIN — DIATRIZOATE MEGLUMINE AND DIATRIZOATE SODIUM SCH ML: 600; 100 SOLUTION ORAL; RECTAL at 11:48

## 2021-09-12 RX ADMIN — DEXTROSE MONOHYDRATE SCH MLS/HR: 5 INJECTION INTRAVENOUS at 23:19

## 2021-09-12 NOTE — IPN
PROGRESS NOTE



DATE:  09/12/2021



HISTORY:  Patient was admitted on September 7, 2021 with abdominal pain and

distention, and CT scan showed a dilated appendix with periappendiceal abscess

in the low mid-abdomen.  His bowel was quite distended.  He underwent

laparoscopy with placement of a drain.  He has remained on Zosyn.  He has been

attempting a diet, but has been taking fairly small amounts.  He is tolerating

some liquids, but reports he is belching a lot and remains quite distended.  



VITAL SIGNS:  Show that he has been afebrile.  His pulse is in the upper 60s to

low 80s.  Blood pressure is good and his room air saturations are normal.  



INTAKE AND OUTPUT:  Show that yesterday he had 3090 in with no volume

measurements of his output.  His drain has had apparently minimal out over the

last two days.  He has voided at least four times, but again, the amount has

not been measured.



PHYSICAL EXAMINATION:  

GENERAL:  Patient is a pleasant young man, sitting up in the hospital bed.  He

appears fairly comfortable.  He is alert and oriented.  

HEART EXAM:  Shows a regular rhythm.

LUNGS:  Clear.

ABDOMEN:  Remains distended.  He has a small dressing in his left upper

quadrant and a drain exiting his mid-abdomen that has a minimal amount of

serous fluid in the tubing.  The abdomen remains distended.  He is tympanitic

across the upper abdomen and fairly firm.  

EXTREMITIES:  Lower extremities are without any significant edema.



LABORATORY STUDIES:

Include a CBC that shows that his white count remains at 17,000 with a

hemoglobin of 12, hematocrit 37 and a platelet count of 476,000.  His

differential count shows 83% neutrophils, 9% lymphocytes and 4% monocytes.



Chemistry profile shows normal electrolytes with a BUN of 11, creatinine 1 and

a glucose of 90. 



Based on his persistent elevated white count and abdominal distention, a CT

scan of the abdomen and pelvis was obtained.  This shows a persistent lower

mid-abdominal abscess.  This, according to the radiologist, is actually

increased in size from the prior study of September 7, 2021.  There is a drain

in the right side of the abdomen, but this is not located within the abscess. 

His small bowel and stomach remain quite distended with fluid and air.  The

radiologist suggested the possibility of an actual bowel obstruction in the

area of the abscess.



IMPRESSION:  Patient has a persistent un-drained abscess of the lower

mid-abdomen from a perforated appendicitis.  Unfortunately, his drain is not in

the abscess cavity.  He remains quite distended.  



PLAN:  Patient was counseled that he will likely require further surgery to

drain his abscess.  I will make him nothing by mouth (NPO).  If he decompresses

adequately, we may be able to avoid replacing his nasogastric (NG) tube.  I

will not reinsert this at this time.  He will remain on the Zosyn.  I will

start him on some Protonix to decrease his gastric secretions and try a small

dose of Reglan to see if this will help decompress his gastrointestinal (GI)

tract somewhat.  I will stop his oral pain medications and put him on morphine

as needed.  I will leave it to Dr. Horowitz in the morning to determine the next

steps surgically.

DENA

## 2021-09-12 NOTE — REP
INDICATION:

evaluate for persistent abscess.



COMPARISON:



09/07/2021.



TECHNIQUE:

Oral contrast was administered.  CT abdomen performed without IV contrast.  CT abdomen

and pelvis performed with the intravenous administration of 100 cc of Isovue 370.

Sagittal and coronal reconstruction images are performed.



FINDINGS:



Lung bases: There is a small right pleural effusion with mild adjacent dependent

atelectatic change.

Liver:  Normal

Gallbladder:  Unremarkable.

Spleen:  Normal.

Adrenals:  Normal.

Pancreas:  Normal.

Kidneys:  Normal.

Small and large bowel: There is significant dilatation of the stomach and jejunum with

air and fluid.  There appears to be a zone of transition from dilated to nondilated

small bowel at the superior margin of the known abscess in the upper pelvis, along the

adjacent surgical drain.

Free fluid:   The lobulated abscess collection is again seen at that location and has

increased in size since the prior study, measuring approximately 5.8 x 5.9 x 8.6 cm.

An appendicolith is again seen at the right superolateral margin of the abscess. There

are surrounding streaky inflammatory changes in the mesenteric fat.

Abdominal aorta: No aneurysm or dissection.

Adenopathy:  Multiple subcentimeter mesenteric lymph nodes are seen..

Appendix: There are findings compatible with ruptured appendicitis as discussed above.

Osseous structures:  Unremarkable.

Pelvis:  No mass.

A surgical drain enters the mid pelvis and the distal tip is located in the anterior

aspect of the right upper quadrant.



IMPRESSION:

Small right pleural effusion with mild adjacent dependent atelectatic change.



There are findings suggesting small bowel obstruction in the region of the superior

aspect of the abscess in the upper pelvis.  The lobulated abscess collection at that

location has increased in size since the prior study measuring approximately 5.8 x 5.9

x 8.6 cm. A surgical drain enters the mid pelvis, the distal tip is in the anterior

aspect of the right upper quadrant.





<Electronically signed by Mirza Crowley > 09/12/21 [id number]

## 2021-09-13 VITALS — DIASTOLIC BLOOD PRESSURE: 81 MMHG | SYSTOLIC BLOOD PRESSURE: 138 MMHG

## 2021-09-13 VITALS — DIASTOLIC BLOOD PRESSURE: 77 MMHG | SYSTOLIC BLOOD PRESSURE: 136 MMHG

## 2021-09-13 VITALS — SYSTOLIC BLOOD PRESSURE: 132 MMHG | DIASTOLIC BLOOD PRESSURE: 75 MMHG

## 2021-09-13 VITALS — DIASTOLIC BLOOD PRESSURE: 82 MMHG | SYSTOLIC BLOOD PRESSURE: 140 MMHG

## 2021-09-13 VITALS — DIASTOLIC BLOOD PRESSURE: 84 MMHG | SYSTOLIC BLOOD PRESSURE: 141 MMHG

## 2021-09-13 VITALS — SYSTOLIC BLOOD PRESSURE: 138 MMHG | DIASTOLIC BLOOD PRESSURE: 82 MMHG

## 2021-09-13 VITALS — DIASTOLIC BLOOD PRESSURE: 77 MMHG | SYSTOLIC BLOOD PRESSURE: 138 MMHG

## 2021-09-13 VITALS — SYSTOLIC BLOOD PRESSURE: 142 MMHG | DIASTOLIC BLOOD PRESSURE: 80 MMHG

## 2021-09-13 LAB
BASOPHILS # BLD AUTO: 0 10^3/UL (ref 0–0.2)
BASOPHILS NFR BLD AUTO: 0.2 % (ref 0–1)
BUN SERPL-MCNC: 8 MG/DL (ref 7–18)
CALCIUM SERPL-MCNC: 8.9 MG/DL (ref 8.5–10.1)
CHLORIDE SERPL-SCNC: 105 MEQ/L (ref 98–107)
CO2 SERPL-SCNC: 29 MEQ/L (ref 21–32)
CREAT SERPL-MCNC: 1.17 MG/DL (ref 0.7–1.3)
EOSINOPHIL # BLD AUTO: 0.1 10^3/UL (ref 0–0.5)
EOSINOPHIL NFR BLD AUTO: 0.6 % (ref 0–3)
GFR SERPL CREATININE-BSD FRML MDRD: > 60 ML/MIN/{1.73_M2} (ref 60–?)
GLUCOSE SERPL-MCNC: 103 MG/DL (ref 70–100)
HCT VFR BLD AUTO: 37.5 % (ref 42–52)
HGB BLD-MCNC: 11.7 G/DL (ref 13.5–17.5)
LYMPHOCYTES # BLD AUTO: 1.2 10^3/UL (ref 1.5–5)
LYMPHOCYTES NFR BLD AUTO: 6.8 % (ref 24–44)
MCH RBC QN AUTO: 27.1 PG (ref 27–33)
MCHC RBC AUTO-ENTMCNC: 31.2 G/DL (ref 32–36.5)
MCV RBC AUTO: 87 FL (ref 80–96)
MONOCYTES # BLD AUTO: 0.8 10^3/UL (ref 0–0.8)
MONOCYTES NFR BLD AUTO: 4.4 % (ref 2–8)
NEUTROPHILS # BLD AUTO: 15.1 10^3/UL (ref 1.5–8.5)
NEUTROPHILS NFR BLD AUTO: 85.9 % (ref 36–66)
PLATELET # BLD AUTO: 466 10^3/UL (ref 150–450)
POTASSIUM SERPL-SCNC: 4 MEQ/L (ref 3.5–5.1)
RBC # BLD AUTO: 4.31 10^6/UL (ref 4.3–6.1)
SODIUM SERPL-SCNC: 139 MEQ/L (ref 136–145)
WBC # BLD AUTO: 17.6 10^3/UL (ref 4–10)

## 2021-09-13 PROCEDURE — 0DTJ4ZZ RESECTION OF APPENDIX, PERCUTANEOUS ENDOSCOPIC APPROACH: ICD-10-PCS | Performed by: SURGERY

## 2021-09-13 PROCEDURE — 8E0W4CZ ROBOTIC ASSISTED PROCEDURE OF TRUNK REGION, PERCUTANEOUS ENDOSCOPIC APPROACH: ICD-10-PCS | Performed by: SURGERY

## 2021-09-13 RX ADMIN — DEXTROSE MONOHYDRATE SCH MLS/HR: 5 INJECTION INTRAVENOUS at 16:43

## 2021-09-13 RX ADMIN — POTASSIUM CHLORIDE, DEXTROSE MONOHYDRATE AND SODIUM CHLORIDE SCH MLS/HR: 150; 5; 450 INJECTION, SOLUTION INTRAVENOUS at 02:42

## 2021-09-13 RX ADMIN — DEXTROSE MONOHYDRATE SCH MLS/HR: 5 INJECTION INTRAVENOUS at 09:06

## 2021-09-13 RX ADMIN — METOCLOPRAMIDE SCH MG: 5 INJECTION, SOLUTION INTRAMUSCULAR; INTRAVENOUS at 20:38

## 2021-09-13 RX ADMIN — DEXTROSE MONOHYDRATE SCH MLS/HR: 5 INJECTION INTRAVENOUS at 23:02

## 2021-09-13 RX ADMIN — DEXTROSE MONOHYDRATE SCH MLS/HR: 5 INJECTION INTRAVENOUS at 02:42

## 2021-09-13 RX ADMIN — METOCLOPRAMIDE SCH MG: 5 INJECTION, SOLUTION INTRAMUSCULAR; INTRAVENOUS at 02:42

## 2021-09-13 RX ADMIN — METOCLOPRAMIDE SCH MG: 5 INJECTION, SOLUTION INTRAMUSCULAR; INTRAVENOUS at 07:59

## 2021-09-13 RX ADMIN — DEXTROSE MONOHYDRATE SCH MG: 50 INJECTION, SOLUTION INTRAVENOUS at 09:00

## 2021-09-13 RX ADMIN — METOCLOPRAMIDE SCH MG: 5 INJECTION, SOLUTION INTRAMUSCULAR; INTRAVENOUS at 15:15

## 2021-09-13 RX ADMIN — POTASSIUM CHLORIDE, DEXTROSE MONOHYDRATE AND SODIUM CHLORIDE SCH MLS/HR: 150; 5; 450 INJECTION, SOLUTION INTRAVENOUS at 15:16

## 2021-09-13 NOTE — RO
OPERATIVE NOTE



DATE OF OPERATION: 09/13/2021



PREOPERATIVE DIAGNOSIS:  Perforated appendicitis with peritoneal abscess.



POSTOPERATIVE DIAGNOSIS: Perforated appendicitis with peritoneal abscess.



PROCEDURE: Robotic-assisted diagnostic laparoscopy with release of abdominal

adhesions, abdominal washout and drain placement.



SURGEON: Mirza Horowitz DO 



ASSISTANT: LANE Summers



ANESTHESIA: General.



ESTIMATED BLOOD LOSS: 10 ml



COMPLICATIONS: None.



INDICATIONS FOR PROCEDURE: The patient is a 26-year-old male who presented with

a complicated perforated appendix with a walled off abscess a week ago. He

underwent a diagnostic laparoscopy with a washout and drain placement then. He

started to do well over a few days. However, he started to get worse again with

increasing pain, ileus and decreased output. Repeat imaging this weekend

revealed that his abscess had recurred. Plan was to take him back today for

washout again and placement of another drain. Risks and benefits of the

procedure not limited to but including bleeding, infection, hernias, damage to

surrounding structures, need for further surgery were discussed in detail with

the patient. Informed consent was obtained and procedure is planned.



DESCRIPTION OF PROCEDURE: The patient was brought back to operating room 7.

After sufficient sedation, the abdomen was sterilely prepped and draped. Next,

a timeout was done to confirm proper patient and proper procedure. Following

that, the original left upper quadrant incision from previous surgery was

reopened and an 8 mm OptiView port was used to gain access to the abdomen. Once

the abdomen was entered, two more ports were placed, one in the left

mid-abdomen, one in the right upper quadrant. Next, the robot was docked to the

ports. Once inside, the small bowel adhesions to the abdominal wall and the

pelvis were gently released using blunt dissection. Once the abscess cavity was

reached just medial to the cecum, a large amount of purulent fluid was

encountered. This was all aspirated out using a suction . Once that

was completed, I continued to mobilize around the cecum, however, could not

definitively identify the appendix due to being extensively inflamed. Because

of that, a 19 [place] drain was then taken. It was placed just medial to the

cecum in this abscess cavity, brought out through the previous incision in the

suprapubic area. The abdomen was then desufflated slowly, making sure that the

drain did not move while the loops of small bowel came over top of it. Once

that was completed, the drain was sutured in place with a 2-0 silk. The skin

incision was closed with 4-0 Vicryl subcuticular sutures. The abdomen was

cleaned and dried. Steri-Strips, 4x4 and tape were applied.

## 2021-09-13 NOTE — IPNPDOC
Text Note


Date of Service


The patient was seen on 9/13/21.





NOTE


Over the weekend his wbc count elevated and repeat CT shows persistent abscess 

in the RLQ resulting in partial SBO vs. ileus. NG was replaced last evening, and

plan is for OR today for washout of the abscess and new drain placement. 





VSSAF





NAD





abd - soft, slight distention, drain in place





labs - below





A) 25y/o male s/p lap washout of perforated appendix with abscess and drain 

placement


P) NG


IVF


abx


plan for repeat drain placement again today with abd washout.


consent is signed


no changes to H+P. 





Lucien Horowitz DO





VS,Cira, I+O


VS, Shellye, I+O


Laboratory Tests


9/13/21 06:45











Vital Signs








  Date Time  Temp Pulse Resp B/P (MAP) Pulse Ox O2 Delivery O2 Flow Rate FiO2


 


9/13/21 06:00 98.7 74 18 142/80 (100) 96 Room Air  


 


9/8/21 04:00       1.0 














I&O- Last 24 Hours up to 6 AM 


 


 9/13/21





 05:59


 


Intake Total 2240 ml


 


Output Total 700 ml


 


Balance 1540 ml

















CAROLA HOROWITZ DO            Sep 13, 2021 07:24

## 2021-09-13 NOTE — REPVR
PROCEDURE INFORMATION: 

Exam: XR Chest 

Exam date and time: 9/12/2021 11:08 PM 

Age: 26 years old 

Clinical indication: Check nasogastric tube placement 



TECHNIQUE: 

Imaging protocol: XR of the chest. 

Views: 1 view. 



COMPARISON: 

CR Abdomen,Flat Upright,PA CHEST 9/9/2021 9:50 AM 



FINDINGS: 

Tubes, catheters and devices: There is a nasogastric tube terminating in the 

proximal body of the stomach. 



Lungs: Unremarkable. No consolidation. No pulmonary edema. 

Pleural spaces: The trace right pleural effusion that can be seen in the CT 

abdomen and pelvis on 9/12/2021 is radiographically occult. No pneumothorax is 

noted. 

Heart/Mediastinum: Unremarkable. No cardiomegaly. 

Bones/joints: Unremarkable. 



Limited gastrointestinal tract: There is gaseous distention of the stomach. 

Dilated loops of small bowel are noted in the upper abdomen, which are better 

visualized in the CT abdomen and pelvis on 9/12/2021. 



IMPRESSION: 

1. Nasogastric tube terminating in the proximal body of the stomach. 

2. Dilated loops of small bowel in the upper abdomen, which can be seen with a 

small bowel obstruction or ileus. 



Electronically signed by: Darien Birmingham On 09/13/2021  00:43:36 AM

## 2021-09-14 VITALS — DIASTOLIC BLOOD PRESSURE: 77 MMHG | SYSTOLIC BLOOD PRESSURE: 131 MMHG

## 2021-09-14 VITALS — SYSTOLIC BLOOD PRESSURE: 132 MMHG | DIASTOLIC BLOOD PRESSURE: 72 MMHG

## 2021-09-14 VITALS — SYSTOLIC BLOOD PRESSURE: 131 MMHG | DIASTOLIC BLOOD PRESSURE: 77 MMHG

## 2021-09-14 VITALS — DIASTOLIC BLOOD PRESSURE: 77 MMHG | SYSTOLIC BLOOD PRESSURE: 132 MMHG

## 2021-09-14 VITALS — DIASTOLIC BLOOD PRESSURE: 78 MMHG | SYSTOLIC BLOOD PRESSURE: 131 MMHG

## 2021-09-14 LAB
BUN SERPL-MCNC: 12 MG/DL (ref 7–18)
CALCIUM SERPL-MCNC: 8.9 MG/DL (ref 8.5–10.1)
CHLORIDE SERPL-SCNC: 104 MEQ/L (ref 98–107)
CO2 SERPL-SCNC: 31 MEQ/L (ref 21–32)
CREAT SERPL-MCNC: 1.22 MG/DL (ref 0.7–1.3)
GFR SERPL CREATININE-BSD FRML MDRD: > 60 ML/MIN/{1.73_M2} (ref 60–?)
GLUCOSE SERPL-MCNC: 101 MG/DL (ref 70–100)
HCT VFR BLD AUTO: 37.2 % (ref 42–52)
HGB BLD-MCNC: 11.8 G/DL (ref 13.5–17.5)
MCH RBC QN AUTO: 27.3 PG (ref 27–33)
MCHC RBC AUTO-ENTMCNC: 31.7 G/DL (ref 32–36.5)
MCV RBC AUTO: 86.1 FL (ref 80–96)
PLATELET # BLD AUTO: 518 10^3/UL (ref 150–450)
POTASSIUM SERPL-SCNC: 4.5 MEQ/L (ref 3.5–5.1)
RBC # BLD AUTO: 4.32 10^6/UL (ref 4.3–6.1)
SODIUM SERPL-SCNC: 140 MEQ/L (ref 136–145)
WBC # BLD AUTO: 13.1 10^3/UL (ref 4–10)

## 2021-09-14 RX ADMIN — POTASSIUM CHLORIDE, DEXTROSE MONOHYDRATE AND SODIUM CHLORIDE SCH MLS/HR: 150; 5; 450 INJECTION, SOLUTION INTRAVENOUS at 04:19

## 2021-09-14 RX ADMIN — POTASSIUM CHLORIDE, DEXTROSE MONOHYDRATE AND SODIUM CHLORIDE SCH MLS/HR: 150; 5; 450 INJECTION, SOLUTION INTRAVENOUS at 17:14

## 2021-09-14 RX ADMIN — METOCLOPRAMIDE SCH MG: 5 INJECTION, SOLUTION INTRAMUSCULAR; INTRAVENOUS at 19:43

## 2021-09-14 RX ADMIN — DEXTROSE MONOHYDRATE SCH MLS/HR: 5 INJECTION INTRAVENOUS at 10:59

## 2021-09-14 RX ADMIN — DEXTROSE MONOHYDRATE SCH MLS/HR: 5 INJECTION INTRAVENOUS at 04:19

## 2021-09-14 RX ADMIN — DEXTROSE MONOHYDRATE SCH MG: 50 INJECTION, SOLUTION INTRAVENOUS at 08:29

## 2021-09-14 RX ADMIN — METOCLOPRAMIDE SCH MG: 5 INJECTION, SOLUTION INTRAMUSCULAR; INTRAVENOUS at 14:00

## 2021-09-14 RX ADMIN — DEXTROSE MONOHYDRATE SCH MLS/HR: 5 INJECTION INTRAVENOUS at 23:23

## 2021-09-14 RX ADMIN — MORPHINE SULFATE PRN MG: 2 INJECTION, SOLUTION INTRAMUSCULAR; INTRAVENOUS at 20:00

## 2021-09-14 RX ADMIN — METOCLOPRAMIDE SCH MG: 5 INJECTION, SOLUTION INTRAMUSCULAR; INTRAVENOUS at 02:09

## 2021-09-14 RX ADMIN — ACETAMINOPHEN PRN MG: 325 TABLET ORAL at 02:23

## 2021-09-14 RX ADMIN — METOCLOPRAMIDE SCH MG: 5 INJECTION, SOLUTION INTRAMUSCULAR; INTRAVENOUS at 08:29

## 2021-09-14 RX ADMIN — DEXTROSE MONOHYDRATE SCH MLS/HR: 5 INJECTION INTRAVENOUS at 17:15

## 2021-09-14 NOTE — IPNPDOC
Text Note


Date of Service


The patient was seen on 9/14/21.





NOTE


General surgery.  Dr. Horowitz





The patient is a 26-year-old male admitted with acute perforated appendicitis 

status post laparoscopic appendectomy with drain placement 9/7/2021 as per Dr. Horowitz.  The patient was noted to have recurrence of abscess, status post 

robotic assisted diagnostic laparoscopy with release of adhesions, abdominal 

washout and drain placement as per Dr. Horowitz 9/13/2021.





The patient is resting in bed.  States pain is controlled.  NG tube in place.  

Denies flatus or BM.  Has not been out of bed yet.





Afebrile.  VSS.


NG tube in place


Lungs clear to auscultation


S1-S2 regular rate rhythm


Abdomen with surgical incisions clean/dry/intact.  DENITA drain in place with small 

amount of serosanguineous drainage noted.  Soft, distended, mild tenderness 

around incision sites.


No edema





WBC 13.1, this is decreased from 17.6.  Hemoglobin 11.8.


NG tube output 525 mL yesterday, 650 mL so far today.  DENITA drain 220 mL 

yesterday, 30 mL today.





Assessment/plan


Acute perforated appendicitis status post laparoscopic appendectomy with drain 

placement 9/7/2021 as per Dr. Horowitz.


The patient was noted to have recurrence of abscess, status post robotic 

assisted diagnostic laparoscopy with release of adhesions, abdominal washout and

drain placement as per Dr. Horowitz 9/13/2021.


The patient reports pain is controlled.


WBC trending downward.


NG tube in place


IV Zosyn


IV fluids 80 cc/h.


Continue to monitor





VS,Fishbone, I+O


VS, Fishbone, I+O


Laboratory Tests


9/14/21 05:49











Vital Signs








  Date Time  Temp Pulse Resp B/P (MAP) Pulse Ox O2 Delivery O2 Flow Rate FiO2


 


9/14/21 06:09 97.6 72 20 131/78 (95) 96 Room Air  


 


9/13/21 20:00       2.0 














I&O- Last 24 Hours up to 6 AM 


 


 9/14/21





 06:00


 


Intake Total 2550 ml


 


Output Total 3640 ml


 


Balance -1090 ml

















Mini Reyes              Sep 14, 2021 09:09

## 2021-09-15 VITALS — DIASTOLIC BLOOD PRESSURE: 79 MMHG | SYSTOLIC BLOOD PRESSURE: 130 MMHG

## 2021-09-15 VITALS — DIASTOLIC BLOOD PRESSURE: 77 MMHG | SYSTOLIC BLOOD PRESSURE: 134 MMHG

## 2021-09-15 LAB
HCT VFR BLD AUTO: 38.7 % (ref 42–52)
HGB BLD-MCNC: 12 G/DL (ref 13.5–17.5)
MCH RBC QN AUTO: 27 PG (ref 27–33)
MCHC RBC AUTO-ENTMCNC: 31 G/DL (ref 32–36.5)
MCV RBC AUTO: 87 FL (ref 80–96)
PLATELET # BLD AUTO: 559 10^3/UL (ref 150–450)
RBC # BLD AUTO: 4.45 10^6/UL (ref 4.3–6.1)
WBC # BLD AUTO: 12.6 10^3/UL (ref 4–10)

## 2021-09-15 RX ADMIN — DEXTROSE MONOHYDRATE SCH MLS/HR: 5 INJECTION INTRAVENOUS at 21:36

## 2021-09-15 RX ADMIN — METOCLOPRAMIDE SCH MG: 5 INJECTION, SOLUTION INTRAMUSCULAR; INTRAVENOUS at 08:00

## 2021-09-15 RX ADMIN — DEXTROSE MONOHYDRATE SCH MG: 50 INJECTION, SOLUTION INTRAVENOUS at 09:38

## 2021-09-15 RX ADMIN — POTASSIUM CHLORIDE, DEXTROSE MONOHYDRATE AND SODIUM CHLORIDE SCH MLS/HR: 150; 5; 450 INJECTION, SOLUTION INTRAVENOUS at 09:38

## 2021-09-15 RX ADMIN — METOCLOPRAMIDE SCH MG: 5 INJECTION, SOLUTION INTRAMUSCULAR; INTRAVENOUS at 21:35

## 2021-09-15 RX ADMIN — DEXTROSE MONOHYDRATE SCH MLS/HR: 5 INJECTION INTRAVENOUS at 04:17

## 2021-09-15 RX ADMIN — MORPHINE SULFATE PRN MG: 2 INJECTION, SOLUTION INTRAMUSCULAR; INTRAVENOUS at 22:17

## 2021-09-15 RX ADMIN — DEXTROSE MONOHYDRATE SCH MLS/HR: 5 INJECTION INTRAVENOUS at 16:33

## 2021-09-15 RX ADMIN — DEXTROSE MONOHYDRATE SCH MLS/HR: 5 INJECTION INTRAVENOUS at 09:52

## 2021-09-15 RX ADMIN — METOCLOPRAMIDE SCH MG: 5 INJECTION, SOLUTION INTRAMUSCULAR; INTRAVENOUS at 02:03

## 2021-09-15 RX ADMIN — POTASSIUM CHLORIDE, DEXTROSE MONOHYDRATE AND SODIUM CHLORIDE SCH MLS/HR: 150; 5; 450 INJECTION, SOLUTION INTRAVENOUS at 16:33

## 2021-09-15 RX ADMIN — METOCLOPRAMIDE SCH MG: 5 INJECTION, SOLUTION INTRAMUSCULAR; INTRAVENOUS at 13:52

## 2021-09-15 NOTE — IPNPDOC
Text Note


Date of Service


The patient was seen on 9/15/21.





NOTE


No acute events overnight. He has been ambulating in the halls, but no flatus or

BM yet. No problems with nausea or emesis with the NG in. NG output is still 

bilious. 





VSSAF





NAD





abd - soft, distention improved, drain in place serosanguinous





labs - below





A) 27y/o male s/p lap washout of perforated appendix with abscess and drain 

placement


P) NG


IVF


abx


ambulate in you, we will advance diet and clamp NG once he is passing flatus. 





Lucien Horowitz DO





VS,Fishbone, I+O


VS, Fishbone, I+O


Laboratory Tests


9/15/21 06:24











Vital Signs








  Date Time  Temp Pulse Resp B/P (MAP) Pulse Ox O2 Delivery O2 Flow Rate FiO2


 


9/15/21 06:46 97.4 80 19 130/79 (96) 94 Room Air  


 


9/13/21 20:00       2.0 














I&O- Last 24 Hours up to 6 AM 


 


 9/15/21





 06:00


 


Intake Total 1220 ml


 


Output Total 2060 ml


 


Balance -840 ml

















CAROLA HOROWITZ DO            Sep 15, 2021 10:14

## 2021-09-16 VITALS — SYSTOLIC BLOOD PRESSURE: 130 MMHG | DIASTOLIC BLOOD PRESSURE: 81 MMHG

## 2021-09-16 VITALS — DIASTOLIC BLOOD PRESSURE: 95 MMHG | SYSTOLIC BLOOD PRESSURE: 144 MMHG

## 2021-09-16 VITALS — DIASTOLIC BLOOD PRESSURE: 94 MMHG | SYSTOLIC BLOOD PRESSURE: 145 MMHG

## 2021-09-16 VITALS — DIASTOLIC BLOOD PRESSURE: 80 MMHG | SYSTOLIC BLOOD PRESSURE: 127 MMHG

## 2021-09-16 VITALS — SYSTOLIC BLOOD PRESSURE: 144 MMHG | DIASTOLIC BLOOD PRESSURE: 95 MMHG

## 2021-09-16 LAB
HCT VFR BLD AUTO: 39.8 % (ref 42–52)
HGB BLD-MCNC: 12.3 G/DL (ref 13.5–17.5)
MCH RBC QN AUTO: 26.7 PG (ref 27–33)
MCHC RBC AUTO-ENTMCNC: 30.9 G/DL (ref 32–36.5)
MCV RBC AUTO: 86.3 FL (ref 80–96)
PLATELET # BLD AUTO: 579 10^3/UL (ref 150–450)
RBC # BLD AUTO: 4.61 10^6/UL (ref 4.3–6.1)
WBC # BLD AUTO: 9.9 10^3/UL (ref 4–10)

## 2021-09-16 RX ADMIN — DEXTROSE MONOHYDRATE SCH MLS/HR: 5 INJECTION INTRAVENOUS at 04:26

## 2021-09-16 RX ADMIN — METOCLOPRAMIDE SCH MG: 5 INJECTION, SOLUTION INTRAMUSCULAR; INTRAVENOUS at 21:35

## 2021-09-16 RX ADMIN — DEXTROSE MONOHYDRATE SCH MLS/HR: 5 INJECTION INTRAVENOUS at 17:47

## 2021-09-16 RX ADMIN — METOCLOPRAMIDE SCH MG: 5 INJECTION, SOLUTION INTRAMUSCULAR; INTRAVENOUS at 09:23

## 2021-09-16 RX ADMIN — METOCLOPRAMIDE SCH MG: 5 INJECTION, SOLUTION INTRAMUSCULAR; INTRAVENOUS at 02:29

## 2021-09-16 RX ADMIN — DEXTROSE MONOHYDRATE SCH MLS/HR: 5 INJECTION INTRAVENOUS at 21:43

## 2021-09-16 RX ADMIN — MORPHINE SULFATE PRN MG: 2 INJECTION, SOLUTION INTRAMUSCULAR; INTRAVENOUS at 21:36

## 2021-09-16 RX ADMIN — POTASSIUM CHLORIDE, DEXTROSE MONOHYDRATE AND SODIUM CHLORIDE SCH MLS/HR: 150; 5; 450 INJECTION, SOLUTION INTRAVENOUS at 00:24

## 2021-09-16 RX ADMIN — DEXTROSE MONOHYDRATE SCH MLS/HR: 5 INJECTION INTRAVENOUS at 09:23

## 2021-09-16 RX ADMIN — METOCLOPRAMIDE SCH MG: 5 INJECTION, SOLUTION INTRAMUSCULAR; INTRAVENOUS at 13:57

## 2021-09-16 RX ADMIN — POTASSIUM CHLORIDE, DEXTROSE MONOHYDRATE AND SODIUM CHLORIDE SCH MLS/HR: 150; 5; 450 INJECTION, SOLUTION INTRAVENOUS at 17:47

## 2021-09-16 RX ADMIN — DEXTROSE MONOHYDRATE SCH MG: 50 INJECTION, SOLUTION INTRAVENOUS at 09:23

## 2021-09-16 NOTE — IPNPDOC
Text Note


Date of Service


The patient was seen on 9/16/21.





NOTE


General surgery.  Dr. Horowitz





The patient is a 26-year-old male admitted with acute perforated appendicitis 

status post laparoscopic appendectomy with drain placement 9/7/2021 as per Dr. Horowitz.  The patient was noted to have recurrence of abscess, status post 

robotic assisted diagnostic laparoscopy with release of adhesions, abdominal 

washout and drain placement as per Dr. Horowitz 9/13/2021.





The patient is resting in bed.  States pain is controlled.  NG tube in place.  

Denies flatus.  No BM documented.





Afebrile.  VSS.


NG tube in place


Lungs clear to auscultation


S1-S2 regular rate rhythm


Abdomen with surgical incisions clean/dry/intact.  DENITA drain in place with small 

amount of serosanguineous drainage noted.  Soft, distended, mild tenderness 

around incision sites.


No edema





WBC 9.9, continued downward trend.


NG tube output 1000 mL yesterday, 350 mL so far today.  


DENITA drain output not documented yesterday.  20 mL documented today.





Assessment/plan


Acute perforated appendicitis status post laparoscopic appendectomy with drain 

placement 9/7/2021 as per Dr. Horowitz.


The patient was noted to have recurrence of abscess, status post robotic 

assisted diagnostic laparoscopy with release of adhesions, abdominal washout and

drain placement as per Dr. Horowitz 9/13/2021.


The pt is reviewed and examined as per Dr Diaz today. 


The patient reports pain is controlled.


WBC continue to trend downward.


The patient denies flatus, has not had BM.


NG tube in place


IV Zosyn


IV fluids 80 cc/h.


AXR pending. 


Encourage out of bed and ambulation


Continue to monitor





VS,Fishbone, I+O


VS, Fishbone, I+O


Laboratory Tests


9/16/21 06:05











Vital Signs








  Date Time  Temp Pulse Resp B/P (MAP) Pulse Ox O2 Delivery O2 Flow Rate FiO2


 


9/16/21 06:00 97.8 69 18 127/80 (96) 97 Room Air  


 


9/13/21 20:00       2.0 














I&O- Last 24 Hours up to 6 AM 


 


 9/16/21





 06:00


 


Intake Total 1930 ml


 


Output Total 2270 ml


 


Balance -340 ml











Attending Note


Attending Note


Patient reports feeling mildly better, the abdomen feels flatter.  No nausea or 

vomiting though no flatus yet.  He drinks nearly a liter for the past 24 hours 

from the nasogastric tube.  He reports he had 1 small bowel movement yesterday.





Still moderately distended, tympanic, relatively quiet.  Nontender on palpation.

 Drainage tube has serosanguineous drainage.





Plan:


I will get a abdominal x-ray to gauge degree of distention of the small bowel 

relative to the colon.  We will try to clamp the tube and see how he does.


Encouraged to ambulate.











Mini Reyes              Sep 16, 2021 08:27


NAOMIE DIAZ MD         Sep 16, 2021 12:46

## 2021-09-16 NOTE — REP
INDICATION:

ffup sbo.



COMPARISON:

Radiograph 09/09/2021. CT 09/12/2021.



TECHNIQUE:

Two AP views abdomen and pelvis.



FINDINGS:

Surgical drain is seen with the tip is now near the midline of the lower abdomen.

Multiple moderately dilated small bowel loops are seen throughout the abdomen, as seen

on the recent CT scan.  There is a small amount of contrast in nondistended right

colon.  A nasogastric tube is seen with side port in the region of the stomach.  There

is also small amount of contrast in the collapsed rectum.



IMPRESSION:

Nasogastric tube with side port in the region of the stomach, surgical drain with tip

in the lower abdomen near the midline.  There are multiple persistent moderately

dilated small bowel loops.





<Electronically signed by Mirza Crowley > 09/16/21 [id number]

## 2021-09-17 VITALS — DIASTOLIC BLOOD PRESSURE: 85 MMHG | SYSTOLIC BLOOD PRESSURE: 133 MMHG

## 2021-09-17 VITALS — SYSTOLIC BLOOD PRESSURE: 143 MMHG | DIASTOLIC BLOOD PRESSURE: 89 MMHG

## 2021-09-17 VITALS — DIASTOLIC BLOOD PRESSURE: 84 MMHG | SYSTOLIC BLOOD PRESSURE: 136 MMHG

## 2021-09-17 LAB
HCT VFR BLD AUTO: 41.2 % (ref 42–52)
HGB BLD-MCNC: 13.1 G/DL (ref 13.5–17.5)
MCH RBC QN AUTO: 27.4 PG (ref 27–33)
MCHC RBC AUTO-ENTMCNC: 31.8 G/DL (ref 32–36.5)
MCV RBC AUTO: 86.2 FL (ref 80–96)
PLATELET # BLD AUTO: 649 10^3/UL (ref 150–450)
RBC # BLD AUTO: 4.78 10^6/UL (ref 4.3–6.1)
WBC # BLD AUTO: 7.4 10^3/UL (ref 4–10)

## 2021-09-17 RX ADMIN — METOCLOPRAMIDE SCH MG: 5 INJECTION, SOLUTION INTRAMUSCULAR; INTRAVENOUS at 20:06

## 2021-09-17 RX ADMIN — DEXTROSE MONOHYDRATE SCH MLS/HR: 5 INJECTION INTRAVENOUS at 15:54

## 2021-09-17 RX ADMIN — DEXTROSE MONOHYDRATE SCH MLS/HR: 5 INJECTION INTRAVENOUS at 11:32

## 2021-09-17 RX ADMIN — MORPHINE SULFATE PRN MG: 2 INJECTION, SOLUTION INTRAMUSCULAR; INTRAVENOUS at 04:23

## 2021-09-17 RX ADMIN — DEXTROSE MONOHYDRATE SCH MLS/HR: 5 INJECTION INTRAVENOUS at 04:18

## 2021-09-17 RX ADMIN — POTASSIUM CHLORIDE, DEXTROSE MONOHYDRATE AND SODIUM CHLORIDE SCH MLS/HR: 150; 5; 450 INJECTION, SOLUTION INTRAVENOUS at 15:55

## 2021-09-17 RX ADMIN — METOCLOPRAMIDE SCH MG: 5 INJECTION, SOLUTION INTRAMUSCULAR; INTRAVENOUS at 08:22

## 2021-09-17 RX ADMIN — DEXTROSE MONOHYDRATE SCH MLS/HR: 5 INJECTION INTRAVENOUS at 22:16

## 2021-09-17 RX ADMIN — POTASSIUM CHLORIDE, DEXTROSE MONOHYDRATE AND SODIUM CHLORIDE SCH MLS/HR: 150; 5; 450 INJECTION, SOLUTION INTRAVENOUS at 06:09

## 2021-09-17 RX ADMIN — METOCLOPRAMIDE SCH MG: 5 INJECTION, SOLUTION INTRAMUSCULAR; INTRAVENOUS at 02:16

## 2021-09-17 RX ADMIN — DEXTROSE MONOHYDRATE SCH MG: 50 INJECTION, SOLUTION INTRAVENOUS at 08:22

## 2021-09-17 RX ADMIN — METOCLOPRAMIDE SCH MG: 5 INJECTION, SOLUTION INTRAMUSCULAR; INTRAVENOUS at 14:00

## 2021-09-17 NOTE — IPNPDOC
Text Note


Date of Service


The patient was seen on 9/17/21.





NOTE


General surgery.  Dr. Horowitz





The patient is a 26-year-old male admitted with acute perforated appendicitis 

status post laparoscopic appendectomy with drain placement 9/7/2021 as per Dr. Horowitz.  The patient was noted to have recurrence of abscess, status post 

robotic assisted diagnostic laparoscopy with release of adhesions, abdominal 

washout and drain placement as per Dr. Horowitz 9/13/2021.





The patient is resting in bed.  States pain is controlled.  NG tube clamped 

since yesterday AM.   Denies flatus or BM. Denies Nausea/vomiting. 





Afebrile.  VSS.


NG tube in place


Lungs clear to auscultation


S1-S2 regular rate rhythm


Abdomen with surgical incisions clean/dry/intact.  DENITA drain in place with small 

amount of serosanguineous drainage noted.  Soft, distended, mild tenderness 

around incision sites.


No edema





WBC 7.4, continued downward trend.


NG tube clamped 


DENITA drain output 20ml yesterday.  7 mL today.





Assessment/plan


Acute perforated appendicitis status post laparoscopic appendectomy with drain 

placement 9/7/2021 as per Dr. Horowitz.


The patient was noted to have recurrence of abscess, status post robotic 

assisted diagnostic laparoscopy with release of adhesions, abdominal washout and

drain placement as per Dr. Horowitz 9/13/2021.


The pt is reviewed and examined as per Dr Horowitz. 


The patient reports pain is controlled.


WBC continue to trend downward.


The patient denies flatus, has not had BM.


tolerated NGT clamping since yesterday AM. 


IV Zosyn


IV fluids 80 cc/h.


AXR yesterday still with dilated loops.


Encourage out of bed and ambulation


trial of clear liquids today, if tolerating clears DC NGT later this afternoon. 


Continue to monitor





VS,Fishbone, I+O


VS, Fishbone, I+O


Laboratory Tests


9/17/21 06:18











Vital Signs








  Date Time  Temp Pulse Resp B/P (MAP) Pulse Ox O2 Delivery O2 Flow Rate FiO2


 


9/17/21 06:00 97.5 61 16 136/84 (101) 98 Room Air  


 


9/13/21 20:00       2.0 














I&O- Last 24 Hours up to 6 AM 


 


 9/17/21





 05:59


 


Intake Total 880 ml


 


Output Total 1350 ml


 


Balance -470 ml

















Mini Reyes              Sep 17, 2021 09:43

## 2021-09-18 VITALS — DIASTOLIC BLOOD PRESSURE: 86 MMHG | SYSTOLIC BLOOD PRESSURE: 137 MMHG

## 2021-09-18 VITALS — SYSTOLIC BLOOD PRESSURE: 137 MMHG | DIASTOLIC BLOOD PRESSURE: 86 MMHG

## 2021-09-18 VITALS — DIASTOLIC BLOOD PRESSURE: 85 MMHG | SYSTOLIC BLOOD PRESSURE: 136 MMHG

## 2021-09-18 LAB
HCT VFR BLD AUTO: 40.9 % (ref 42–52)
HGB BLD-MCNC: 12.8 G/DL (ref 13.5–17.5)
MCH RBC QN AUTO: 26.7 PG (ref 27–33)
MCHC RBC AUTO-ENTMCNC: 31.3 G/DL (ref 32–36.5)
MCV RBC AUTO: 85.2 FL (ref 80–96)
PLATELET # BLD AUTO: 696 10^3/UL (ref 150–450)
RBC # BLD AUTO: 4.8 10^6/UL (ref 4.3–6.1)
WBC # BLD AUTO: 7.2 10^3/UL (ref 4–10)

## 2021-09-18 RX ADMIN — DEXTROSE MONOHYDRATE SCH MLS/HR: 5 INJECTION INTRAVENOUS at 04:18

## 2021-09-18 RX ADMIN — DEXTROSE MONOHYDRATE SCH MG: 50 INJECTION, SOLUTION INTRAVENOUS at 09:07

## 2021-09-18 RX ADMIN — METOCLOPRAMIDE SCH MG: 5 INJECTION, SOLUTION INTRAMUSCULAR; INTRAVENOUS at 09:00

## 2021-09-18 RX ADMIN — SIMETHICONE SCH MG: 80 TABLET, CHEWABLE ORAL at 16:48

## 2021-09-18 RX ADMIN — SIMETHICONE SCH MG: 80 TABLET, CHEWABLE ORAL at 20:38

## 2021-09-18 RX ADMIN — METOCLOPRAMIDE SCH MG: 5 INJECTION, SOLUTION INTRAMUSCULAR; INTRAVENOUS at 20:38

## 2021-09-18 RX ADMIN — DEXTROSE MONOHYDRATE SCH MLS/HR: 5 INJECTION INTRAVENOUS at 22:59

## 2021-09-18 RX ADMIN — POTASSIUM CHLORIDE, DEXTROSE MONOHYDRATE AND SODIUM CHLORIDE SCH MLS/HR: 150; 5; 450 INJECTION, SOLUTION INTRAVENOUS at 09:00

## 2021-09-18 RX ADMIN — METOCLOPRAMIDE SCH MG: 5 INJECTION, SOLUTION INTRAMUSCULAR; INTRAVENOUS at 14:06

## 2021-09-18 RX ADMIN — DEXTROSE MONOHYDRATE SCH MLS/HR: 5 INJECTION INTRAVENOUS at 16:48

## 2021-09-18 RX ADMIN — Medication SCH EA: at 09:00

## 2021-09-18 RX ADMIN — METOCLOPRAMIDE SCH MG: 5 INJECTION, SOLUTION INTRAMUSCULAR; INTRAVENOUS at 01:49

## 2021-09-18 RX ADMIN — SIMETHICONE SCH MG: 80 TABLET, CHEWABLE ORAL at 09:07

## 2021-09-18 RX ADMIN — DEXTROSE MONOHYDRATE SCH MLS/HR: 5 INJECTION INTRAVENOUS at 10:40

## 2021-09-18 RX ADMIN — POTASSIUM CHLORIDE, DEXTROSE MONOHYDRATE AND SODIUM CHLORIDE SCH MLS/HR: 150; 5; 450 INJECTION, SOLUTION INTRAVENOUS at 20:39

## 2021-09-18 RX ADMIN — SIMETHICONE SCH MG: 80 TABLET, CHEWABLE ORAL at 14:00

## 2021-09-18 RX ADMIN — ACETAMINOPHEN PRN MG: 325 TABLET ORAL at 01:09

## 2021-09-18 NOTE — IPN
PROGRESS NOTE



DATE:  09/18/2021



SUBJECTIVE:  The patient is status post draining of a periappendiceal

abscess/pelvic abscess and has been afebrile over the last 24 hours.  His drain

has been putting out minimal drainage.  It is serosanguineous but a little bit

on the dark side, probably old blood and from a laboratory standpoint he

continues to be within normal white count range.  Overall he states that he has

been having bowel movements and some flatus this morning but still feels

distended.  



OBJECTIVE:  

He is tensely distended, tympanitic without guarding, without rebound.  He has

been tolerating some clear liquids this morning.  



ASSESSMENT AND PLAN: The patient still has somewhat of an ileus associated with

his infectious process and inflammation appreciated associated with this.  

Thus at this point I would recommend that we keep him on clear liquids.  We

will increase his activity and since he is not really taking a lot of liquids

in, we will keep his IV fluids going.  We will see how things go over the next

24 hours but otherwise continue with the current treatment as planned.

## 2021-09-19 VITALS — SYSTOLIC BLOOD PRESSURE: 134 MMHG | DIASTOLIC BLOOD PRESSURE: 86 MMHG

## 2021-09-19 LAB
HCT VFR BLD AUTO: 39.5 % (ref 42–52)
HGB BLD-MCNC: 12.6 G/DL (ref 13.5–17.5)
MCH RBC QN AUTO: 27 PG (ref 27–33)
MCHC RBC AUTO-ENTMCNC: 31.9 G/DL (ref 32–36.5)
MCV RBC AUTO: 84.8 FL (ref 80–96)
PLATELET # BLD AUTO: 681 10^3/UL (ref 150–450)
RBC # BLD AUTO: 4.66 10^6/UL (ref 4.3–6.1)
WBC # BLD AUTO: 7.6 10^3/UL (ref 4–10)

## 2021-09-19 RX ADMIN — DEXTROSE MONOHYDRATE SCH MLS/HR: 5 INJECTION INTRAVENOUS at 10:37

## 2021-09-19 RX ADMIN — METOCLOPRAMIDE SCH MG: 5 INJECTION, SOLUTION INTRAMUSCULAR; INTRAVENOUS at 02:14

## 2021-09-19 RX ADMIN — Medication SCH EA: at 09:44

## 2021-09-19 RX ADMIN — DEXTROSE MONOHYDRATE SCH MG: 50 INJECTION, SOLUTION INTRAVENOUS at 08:35

## 2021-09-19 RX ADMIN — POTASSIUM CHLORIDE, DEXTROSE MONOHYDRATE AND SODIUM CHLORIDE SCH MLS/HR: 150; 5; 450 INJECTION, SOLUTION INTRAVENOUS at 04:07

## 2021-09-19 RX ADMIN — DEXTROSE MONOHYDRATE SCH MLS/HR: 5 INJECTION INTRAVENOUS at 04:07

## 2021-09-19 RX ADMIN — SIMETHICONE SCH MG: 80 TABLET, CHEWABLE ORAL at 08:35

## 2021-09-19 RX ADMIN — METOCLOPRAMIDE SCH MG: 5 INJECTION, SOLUTION INTRAMUSCULAR; INTRAVENOUS at 08:35

## 2021-09-20 NOTE — DSES
DISCHARGE SUMMARY



DATE OF ADMISSION:  09/07/2021

DATE OF DISCHARGE:  09/19/2021



ADMISSION DIAGNOSIS: Ruptured appendicitis.



DISCHARGE DIAGNOSIS: Ruptured appendicitis.



HOSPITAL COURSE:  The patient is a 26-year-old male who presented to the

hospital with abdominal pain for a few days, found to have perforated appendix

with walled off abscess on CT. Due to pain and elevated white count plan was to

take him to the operating room for washout and drain placement. He was not a

candidate for IR drainage due to the location of the abscess being posterior to

some small bowel.



He was brought to the operating room on 7th. He had abdominal washout and drain

placed. Postoperatively he did well. His white count did improve for about

three days but then it started to go back up again. He was tolerating diet

during that time, having some slight flatus. However, then he started having

increased abdominal distention again. He then had repeat CT scan done on the

12th showing that he had recurrence of this abscess. On the morning of the 13th

he was brought back to the operating room for repeat abdominal washout and

manipulation of the drain back into the abscess cavity that it had fallen out

of. There was again a large fluid collection of purulent fluid there that was

washed out during the procedure and the drain was repositioned. Postop from

that procedure he continued to have ileus for a few days. After postop day

three the NG tube was clamped and he was started on clear liquids. On the 4th

postop day he tolerated that and the NG tube was removed. By postop day five

from his second procedure his diet was slowly advanced and then on the 19th he

was discharged home.



DISCHARGE MEDICATIONS:  He was sent home with PO antibiotics. He did not

require any pain control upon discharge.



His drain was also removed prior to discharge. He will follow up with us in the

office in a couple of weeks with further recommendations to follow. Likely plan

on elective robotic appendectomy at least 6-8 weeks from now.

DENA

## 2021-09-23 NOTE — RO
OPERATIVE NOTE







DATE OF OPERATION: 09/07/2021



PREOPERATIVE DIAGNOSIS: Ruptured appendicitis. 



POSTOPERATIVE DIAGNOSIS: Ruptured appendicitis. 



PROCEDURE: Diagnostic laparoscopy with abdominal washout.  Abscess drainage and

drain placement.  



SURGEON: Mirza Horowitz DO



ASSISTANT: None. 



ANESTHESIA: General.



ESTIMATED BLOOD LOSS: 5.



COMPLICATIONS:  None.  



INDICATION FOR PROCEDURE:  The patient is a 26-year-old male who presented to

the hospital on the 7th with abdominal pains for over a week, found to have

ruptured appendix with a walled off abscess on CAT scan.  This abscess was

unable to be drained by percutaneous drainage; therefore recommendation was to

proceed with operative intervention.  Risks and benefits of the procedure were

not limited to, but included bleeding, infection, hernias, damage to

surrounding structures and need for further surgery is discussed in details

with the patient and informed consent was obtained and procedure was planned.  



DESCRIPTION OF PROCEDURE: The patient was brought back to operating room. After

sufficient sedation, the abdomen was sterilely prepped and draped.  Next,

time-out was done to confirm proper patient and proper procedure.  Following

that, an 8 mm incision was made in the left upper quadrant.  Veress needle

inserted and the abdomen was insufflated to 50 mmHg.  The Veress needle was

then removed and a 5 mm Optiview port was used to gain access to the abdomen.

Once the abdomen was entered, there were multiple dilated loops of small bowel.

I was able to get over top of these, look down into the pelvis and place

another suprapubic midline 5 mm port. From there, I was able to gently mobilize

the small bowel out of the pelvis, away from the abscess and enter the abscess

cavity.  The abscess cavity was aspirated out.  I was able to fully irrigate

and wash out that area. The medial cecum and terminal ileum were all acutely

and severely inflamed. There was significant inflammation in that area.

Therefore removal of the appendix itself was unable to be obtained at this

time.  The area was fully washed out and a 19 Sami Melecio drain was placed

inside the abscess cavity and brought out through the suprapubic port site and

sutured to the skin with a 2-0 silk suture. The abdomen was then desulflated.

The other port was removed.  Skin incisions were closed with 4-0 Vicryl

subcuticular sutures.  Incisions were covered with tape and gauze. The patient

was awakened from anesthesia and sent to the PACU in stable condition.

## 2021-09-23 NOTE — RO
OPERATIVE NOTE



DATE OF OPERATION: 09/07/2021



PREOPERATIVE DIAGNOSIS: Acute ruptured appendicitis.  



POSTOPERATIVE DIAGNOSIS: Acute ruptured appendicitis.  



PROCEDURES: Exploratory laparoscopy with abdominal washout and drain placement. 



SURGEON: Mirza Horowitz DO 



ASSISTANT: None. 



ANESTHESIA: General.



ESTIMATED BLOOD LOSS: 2 mL. 



COMPLICATIONS: None.



INDICATIONS FOR PROCEDURE: The patient is a 26-year-old male who presents with

abdominal pain for a week and was found to have a walled off abscess on CT.

Drainage was not amenable to IR drain secondary to being posterior to loops of

small bowel.  Because of this, the recommendation was to proceed with

exploratory surgery and a possible appendectomy. Risks and benefits of the

procedure, not limited to, but including bleeding, infection, hernia, damage to

surrounding structures, and need for further surgery were discussed in detail

with the patient. Informed consent was obtained and the procedure was planned.



PROCEDURE: The patient was brought back to operating room #1. After successful

sedation, the abdomen was sterilely prepped and draped. Next, a time-out was

done to confirm the proper patient and proper procedure. 



Following that, a 5-mm incision was made in the left upper quadrant. Veress

needle was inserted and the abdomen was insufflated to 15 mmHg. The Veress

needle was then removed and a 5-mm Optiview port was used to gain access to the

abdomen. Once the abdomen was entered, there were multiple dilated loops of

small and large bowel, making it difficult to see the appendix. I was able to

place another 5-mm port suprapubically in the midline. After moving the small

bowel loops out of the right lower quadrant, this revealed a large pocket of

purulent fluid. This fluid was all aspirated out. The cecum was densely adhered

to the posterior abdomen. The terminal ileum was adhered to the abdomen and the

appendix was completely obliterated in this area. The inflammation involved the

cecum. Because of this, it was not safe to remove just the appendix without

doing a partial bowel resection. Because of that, I left a drain in place. I

washed out the area. The abdomen was then desufflated. The drain was sutured in

place with a 3-0 silk suture. The left upper quadrant incision was closed with

4-0 Vicryl sutures subcuticular sutures. Steri-Strips and 4x4 and tape were

applied.

MTDD

## 2021-11-02 ENCOUNTER — HOSPITAL ENCOUNTER (OUTPATIENT)
Dept: HOSPITAL 53 - M RAD | Age: 26
End: 2021-11-02
Attending: SURGERY
Payer: COMMERCIAL

## 2021-11-02 DIAGNOSIS — Z98.890: ICD-10-CM

## 2021-11-02 DIAGNOSIS — K35.33: Primary | ICD-10-CM

## 2021-11-02 PROCEDURE — 74177 CT ABD & PELVIS W/CONTRAST: CPT

## 2021-11-02 NOTE — REP
INDICATION:

ACUTE APPENDICITIS WITH PERF AND LOC PERITONITIS.



COMPARISON:

CT 09/12/2021



TECHNIQUE:

Oral Gastrografin mixture 10 mL in 290 mL flavum water for 2 doses per our bowel

contrast protocol bolus 60 mL Isovue 370 scanning through the abdomen and pelvis with

coronal and sagittal reconstructions.



FINDINGS:

CT abdomen: Lung bases are clear heart is not enlarged.  There is no pericardial

thickening or effusion.  No hiatal hernia.  Liver, gallbladder, spleen, pancreas and

adrenal glands are unremarkable.  Kidneys show symmetric enhancement without mass,

hydronephrosis, stone or cyst.  The aorta is without aneurysm no periaortic

lymphadenopathy.  Scattered small mesenteric nodes are seen which I would regard is

unremarkable.  Lung window review of all CT slices in the abdomen and pelvis shows no

evidence of perforation or free air.  No pneumatosis.  Colon shows scattered stool gas

and is without dilatation small bowel loop caliber is normal with contrast within.

Previously noted small bowel obstruction is resolved.  The stomach is a gas filled but

without suggestion of obstruction as on the previous study.  Complex abscess noted on

the previous study appears resolved.  Surgical drain on that prior CT is removed.

Adjacent sigmoid and small bowel loops are without dilatation.  No ascites or any

loculated collection in the abdomen.  No periaortic, other retroperitoneal or

mesenteric pathologic sized lymphadenopathy.  10 mm calcification best seen on image

85 axial and 37 coronal and is suggestive of a fecalith.  This is the same 1 seen on

previous study adjacent to the abscess.  Could be in the appendiceal stump or adjacent

cecal wall. Bone windows show spine and visualized ribs unremarkable.



CT pelvis: Sacrum, SI joints pelvis and hips were unremarkable a distal small bowel

loops with oral contrast throughout.  None of the contrast reaches the cecum but is

seen in the distal ileum and terminal ileum.  No dilated loops.  The distal left colon

sigmoid and rectum with stool and gas but no inflammatory change.  No peripelvic free

fluid.  Bladder without wall thickening mass or stone.  No dilated distal ureters or

ureteral/bladder stone.  See no ventral or inguinal hernia nor pathologic sized

inguinal adenopathy.



IMPRESSION:

1. Interval resolution of prominent lobulated the pelvic abscess and removal of

surgical drain since prior study 09/12/2021.  A a 10 mm fecalith is noted and

unchanged in position either within the appendiceal stump or cecal wall.

2. No bowel obstruction, ileus, ascites, adenopathy, mass or free air.

3. No other significant or acute finding.





<Electronically signed by Jac Wilson > 11/02/21 [id number]

## 2021-11-23 ENCOUNTER — HOSPITAL ENCOUNTER (OUTPATIENT)
Dept: HOSPITAL 53 - M SDC | Age: 26
Discharge: HOME | End: 2021-11-23
Attending: SURGERY
Payer: COMMERCIAL

## 2021-11-23 VITALS — WEIGHT: 185 LBS | HEIGHT: 74 IN | BODY MASS INDEX: 23.74 KG/M2

## 2021-11-23 VITALS — SYSTOLIC BLOOD PRESSURE: 137 MMHG | DIASTOLIC BLOOD PRESSURE: 70 MMHG

## 2021-11-23 DIAGNOSIS — K36: Primary | ICD-10-CM

## 2021-11-23 PROCEDURE — 88304 TISSUE EXAM BY PATHOLOGIST: CPT

## 2021-11-23 PROCEDURE — 44970 LAPAROSCOPY APPENDECTOMY: CPT

## 2023-03-30 ENCOUNTER — HOSPITAL ENCOUNTER (OUTPATIENT)
Dept: HOSPITAL 53 - M WUC | Age: 28
End: 2023-03-30
Attending: INTERNAL MEDICINE
Payer: COMMERCIAL

## 2023-03-30 ENCOUNTER — HOSPITAL ENCOUNTER (OUTPATIENT)
Dept: HOSPITAL 53 - M SFHCRHEU | Age: 28
End: 2023-03-30
Attending: INTERNAL MEDICINE
Payer: COMMERCIAL

## 2023-03-30 DIAGNOSIS — R76.8: Primary | ICD-10-CM

## 2023-03-30 DIAGNOSIS — M25.50: ICD-10-CM

## 2023-03-30 LAB
ALBUMIN SERPL BCG-MCNC: 4.3 G/DL (ref 3.2–5.2)
ALP SERPL-CCNC: 73 U/L (ref 46–116)
ALT SERPL W P-5'-P-CCNC: 123 U/L (ref 7–40)
AMORPH SED URNS QL MICRO: (no result)
APPEARANCE UR: (no result)
AST SERPL-CCNC: 381 U/L (ref ?–34)
BACTERIA UR QL AUTO: NEGATIVE
BASOPHILS # BLD AUTO: 0 10^3/UL (ref 0–0.2)
BASOPHILS NFR BLD AUTO: 0.4 % (ref 0–1)
BILIRUB SERPL-MCNC: 0.7 MG/DL (ref 0.3–1.2)
BILIRUB UR QL STRIP.AUTO: NEGATIVE
BUN SERPL-MCNC: 15 MG/DL (ref 9–23)
C3 SERPL-MCNC: 148.1 MG/DL (ref 82–160)
C4 SERPL-MCNC: 40.6 MG/DL (ref 12–36)
CALCIUM SERPL-MCNC: 9.4 MG/DL (ref 8.5–10.1)
CHLORIDE SERPL-SCNC: 101 MMOL/L (ref 98–107)
CO2 SERPL-SCNC: 31 MMOL/L (ref 20–31)
CREAT SERPL-MCNC: 1.19 MG/DL (ref 0.7–1.3)
CREAT UR-MCNC: 378.2 MG/DL
CRP SERPL-MCNC: 1.2 MG/DL (ref ?–1)
EOSINOPHIL # BLD AUTO: 0 10^3/UL (ref 0–0.5)
EOSINOPHIL NFR BLD AUTO: 0.4 % (ref 0–3)
ERYTHROCYTE [SEDIMENTATION RATE] IN BLOOD BY WESTERGREN METHOD: 18 MM/HR (ref 0–15)
GFR SERPL CREATININE-BSD FRML MDRD: > 60 ML/MIN/{1.73_M2} (ref 60–?)
GLUCOSE SERPL-MCNC: 73 MG/DL (ref 60–100)
GLUCOSE UR QL STRIP.AUTO: NEGATIVE MG/DL
HCT VFR BLD AUTO: 45.9 % (ref 42–52)
HGB BLD-MCNC: 14.5 G/DL (ref 13.5–17.5)
HGB UR QL STRIP.AUTO: NEGATIVE
KETONES UR QL STRIP.AUTO: (no result) MG/DL
LEUKOCYTE ESTERASE UR QL STRIP.AUTO: NEGATIVE
LYMPHOCYTES # BLD AUTO: 1.9 10^3/UL (ref 1.5–5)
LYMPHOCYTES NFR BLD AUTO: 43.6 % (ref 24–44)
MCH RBC QN AUTO: 27.2 PG (ref 27–33)
MCHC RBC AUTO-ENTMCNC: 31.6 G/DL (ref 32–36.5)
MCV RBC AUTO: 86 FL (ref 80–96)
MONOCYTES # BLD AUTO: 0.3 10^3/UL (ref 0–0.8)
MONOCYTES NFR BLD AUTO: 6.1 % (ref 2–8)
NEUTROPHILS # BLD AUTO: 2.2 10^3/UL (ref 1.5–8.5)
NEUTROPHILS NFR BLD AUTO: 49.3 % (ref 36–66)
NITRITE UR QL STRIP.AUTO: NEGATIVE
PH UR STRIP.AUTO: 6 UNITS (ref 5–9)
PLATELET # BLD AUTO: 290 10^3/UL (ref 150–450)
POTASSIUM SERPL-SCNC: 3.6 MMOL/L (ref 3.5–5.1)
PROT SERPL-MCNC: 7.6 G/DL (ref 5.7–8.2)
PROT UR QL STRIP.AUTO: NEGATIVE MG/DL
PROT UR-MCNC: 13.1 MG/DL (ref 0–14)
RBC # BLD AUTO: 5.34 10^6/UL (ref 4.3–6.1)
RBC # UR AUTO: 1 /HPF (ref 0–3)
SODIUM SERPL-SCNC: 137 MMOL/L (ref 136–145)
SP GR UR STRIP.AUTO: 1.03 (ref 1–1.03)
SQUAMOUS #/AREA URNS AUTO: 0 /HPF (ref 0–6)
UROBILINOGEN UR QL STRIP.AUTO: 0.2 MG/DL (ref 0–2)
WBC # BLD AUTO: 4.5 10^3/UL (ref 4–10)
WBC #/AREA URNS AUTO: 0 /HPF (ref 0–3)

## 2023-03-30 PROCEDURE — 85652 RBC SED RATE AUTOMATED: CPT

## 2023-03-30 PROCEDURE — 81374 HLA I TYPING 1 ANTIGEN LR: CPT

## 2023-03-30 PROCEDURE — 85613 RUSSELL VIPER VENOM DILUTED: CPT

## 2023-03-30 PROCEDURE — 85732 THROMBOPLASTIN TIME PARTIAL: CPT

## 2023-03-30 PROCEDURE — 85025 COMPLETE CBC W/AUTO DIFF WBC: CPT

## 2023-03-30 PROCEDURE — 84156 ASSAY OF PROTEIN URINE: CPT

## 2023-03-30 PROCEDURE — 86162 COMPLEMENT TOTAL (CH50): CPT

## 2023-03-30 PROCEDURE — 36415 COLL VENOUS BLD VENIPUNCTURE: CPT

## 2023-03-30 PROCEDURE — 72202 X-RAY EXAM SI JOINTS 3/> VWS: CPT

## 2023-03-30 PROCEDURE — 86140 C-REACTIVE PROTEIN: CPT

## 2023-03-30 PROCEDURE — 82570 ASSAY OF URINE CREATININE: CPT

## 2023-03-30 PROCEDURE — 81001 URINALYSIS AUTO W/SCOPE: CPT

## 2023-03-30 PROCEDURE — 73120 X-RAY EXAM OF HAND: CPT

## 2023-03-30 PROCEDURE — 80053 COMPREHEN METABOLIC PANEL: CPT

## 2023-03-30 PROCEDURE — 73630 X-RAY EXAM OF FOOT: CPT

## 2023-03-30 PROCEDURE — 86160 COMPLEMENT ANTIGEN: CPT

## 2023-04-06 LAB — CH50 SERPL-ACNC: 56 U/ML (ref 41–?)
